# Patient Record
Sex: MALE | Race: WHITE | NOT HISPANIC OR LATINO
[De-identification: names, ages, dates, MRNs, and addresses within clinical notes are randomized per-mention and may not be internally consistent; named-entity substitution may affect disease eponyms.]

---

## 2017-10-02 PROBLEM — Z00.00 ENCOUNTER FOR PREVENTIVE HEALTH EXAMINATION: Status: ACTIVE | Noted: 2017-10-02

## 2017-10-03 ENCOUNTER — APPOINTMENT (OUTPATIENT)
Dept: SURGICAL ONCOLOGY | Facility: CLINIC | Age: 57
End: 2017-10-03
Payer: MEDICAID

## 2017-10-03 VITALS
DIASTOLIC BLOOD PRESSURE: 84 MMHG | BODY MASS INDEX: 28.44 KG/M2 | WEIGHT: 192 LBS | SYSTOLIC BLOOD PRESSURE: 157 MMHG | HEIGHT: 69 IN | TEMPERATURE: 98.7 F | OXYGEN SATURATION: 99 % | HEART RATE: 83 BPM

## 2017-10-03 DIAGNOSIS — A04.8 OTHER SPECIFIED BACTERIAL INTESTINAL INFECTIONS: ICD-10-CM

## 2017-10-03 DIAGNOSIS — Z87.891 PERSONAL HISTORY OF NICOTINE DEPENDENCE: ICD-10-CM

## 2017-10-03 PROCEDURE — 99205 OFFICE O/P NEW HI 60 MIN: CPT

## 2017-10-03 RX ORDER — OMEPRAZOLE 20 MG/1
20 CAPSULE, DELAYED RELEASE ORAL
Refills: 0 | Status: ACTIVE | COMMUNITY

## 2017-10-03 RX ORDER — AMOXICILLIN 500 MG/1
500 TABLET, FILM COATED ORAL
Qty: 56 | Refills: 0 | Status: ACTIVE | COMMUNITY
Start: 2017-10-03 | End: 1900-01-01

## 2017-10-03 RX ORDER — CLARITHROMYCIN 500 MG/1
500 TABLET, FILM COATED ORAL TWICE DAILY
Qty: 28 | Refills: 0 | Status: ACTIVE | COMMUNITY
Start: 2017-10-03 | End: 1900-01-01

## 2017-10-06 ENCOUNTER — OUTPATIENT (OUTPATIENT)
Dept: OUTPATIENT SERVICES | Facility: HOSPITAL | Age: 57
LOS: 1 days | End: 2017-10-06
Payer: MEDICAID

## 2017-10-06 ENCOUNTER — RESULT REVIEW (OUTPATIENT)
Age: 57
End: 2017-10-06

## 2017-10-06 VITALS
HEART RATE: 72 BPM | DIASTOLIC BLOOD PRESSURE: 76 MMHG | HEIGHT: 69 IN | RESPIRATION RATE: 18 BRPM | WEIGHT: 195.11 LBS | SYSTOLIC BLOOD PRESSURE: 126 MMHG | TEMPERATURE: 99 F

## 2017-10-06 DIAGNOSIS — C20 MALIGNANT NEOPLASM OF RECTUM: ICD-10-CM

## 2017-10-06 DIAGNOSIS — Z01.812 ENCOUNTER FOR PREPROCEDURAL LABORATORY EXAMINATION: ICD-10-CM

## 2017-10-06 LAB
ALBUMIN SERPL ELPH-MCNC: 3.5 G/DL — SIGNIFICANT CHANGE UP (ref 3.5–5)
ALP SERPL-CCNC: 74 U/L — SIGNIFICANT CHANGE UP (ref 40–120)
ALT FLD-CCNC: 37 U/L DA — SIGNIFICANT CHANGE UP (ref 10–60)
ANION GAP SERPL CALC-SCNC: 7 MMOL/L — SIGNIFICANT CHANGE UP (ref 5–17)
APTT BLD: 30.7 SEC — SIGNIFICANT CHANGE UP (ref 27.5–37.4)
AST SERPL-CCNC: 12 U/L — SIGNIFICANT CHANGE UP (ref 10–40)
BILIRUB SERPL-MCNC: 0.5 MG/DL — SIGNIFICANT CHANGE UP (ref 0.2–1.2)
BUN SERPL-MCNC: 9 MG/DL — SIGNIFICANT CHANGE UP (ref 7–18)
CALCIUM SERPL-MCNC: 8.6 MG/DL — SIGNIFICANT CHANGE UP (ref 8.4–10.5)
CHLORIDE SERPL-SCNC: 109 MMOL/L — HIGH (ref 96–108)
CO2 SERPL-SCNC: 26 MMOL/L — SIGNIFICANT CHANGE UP (ref 22–31)
CREAT SERPL-MCNC: 0.77 MG/DL — SIGNIFICANT CHANGE UP (ref 0.5–1.3)
GLUCOSE SERPL-MCNC: 115 MG/DL — HIGH (ref 70–99)
HBA1C BLD-MCNC: 5 % — SIGNIFICANT CHANGE UP (ref 4–5.6)
HCT VFR BLD CALC: 45.1 % — SIGNIFICANT CHANGE UP (ref 39–50)
HGB BLD-MCNC: 15.5 G/DL — SIGNIFICANT CHANGE UP (ref 13–17)
INR BLD: 1.05 RATIO — SIGNIFICANT CHANGE UP (ref 0.88–1.16)
MCHC RBC-ENTMCNC: 30.1 PG — SIGNIFICANT CHANGE UP (ref 27–34)
MCHC RBC-ENTMCNC: 34.3 GM/DL — SIGNIFICANT CHANGE UP (ref 32–36)
MCV RBC AUTO: 87.6 FL — SIGNIFICANT CHANGE UP (ref 80–100)
PATHOLOGY CONSULT NOTE: SIGNIFICANT CHANGE UP
PLATELET # BLD AUTO: 200 K/UL — SIGNIFICANT CHANGE UP (ref 150–400)
POTASSIUM SERPL-MCNC: 3.8 MMOL/L — SIGNIFICANT CHANGE UP (ref 3.5–5.3)
POTASSIUM SERPL-SCNC: 3.8 MMOL/L — SIGNIFICANT CHANGE UP (ref 3.5–5.3)
PROT SERPL-MCNC: 6.8 G/DL — SIGNIFICANT CHANGE UP (ref 6–8.3)
PROTHROM AB SERPL-ACNC: 11.5 SEC — SIGNIFICANT CHANGE UP (ref 9.8–12.7)
RBC # BLD: 5.15 M/UL — SIGNIFICANT CHANGE UP (ref 4.2–5.8)
RBC # FLD: 11.4 % — SIGNIFICANT CHANGE UP (ref 10.3–14.5)
SODIUM SERPL-SCNC: 142 MMOL/L — SIGNIFICANT CHANGE UP (ref 135–145)
WBC # BLD: 6.5 K/UL — SIGNIFICANT CHANGE UP (ref 3.8–10.5)
WBC # FLD AUTO: 6.5 K/UL — SIGNIFICANT CHANGE UP (ref 3.8–10.5)

## 2017-10-06 PROCEDURE — 88321 CONSLTJ&REPRT SLD PREP ELSWR: CPT

## 2017-10-06 PROCEDURE — G0463: CPT

## 2017-10-06 PROCEDURE — 85610 PROTHROMBIN TIME: CPT

## 2017-10-06 PROCEDURE — 36415 COLL VENOUS BLD VENIPUNCTURE: CPT

## 2017-10-06 PROCEDURE — 85027 COMPLETE CBC AUTOMATED: CPT

## 2017-10-06 PROCEDURE — 86901 BLOOD TYPING SEROLOGIC RH(D): CPT

## 2017-10-06 PROCEDURE — 85730 THROMBOPLASTIN TIME PARTIAL: CPT

## 2017-10-06 PROCEDURE — 83036 HEMOGLOBIN GLYCOSYLATED A1C: CPT

## 2017-10-06 PROCEDURE — 86850 RBC ANTIBODY SCREEN: CPT

## 2017-10-06 PROCEDURE — 80053 COMPREHEN METABOLIC PANEL: CPT

## 2017-10-06 RX ORDER — ALVIMOPAN 12 MG/1
12 CAPSULE ORAL ONCE
Qty: 0 | Refills: 0 | Status: COMPLETED | OUTPATIENT
Start: 2017-10-19 | End: 2017-10-19

## 2017-10-06 NOTE — H&P PST ADULT - ASSESSMENT
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  Patient also being treated for H pylori.

## 2017-10-06 NOTE — H&P PST ADULT - NSANTHOSAYNRD_GEN_A_CORE
No. JARRETT screening performed.  STOP BANG Legend: 0-2 = LOW Risk; 3-4 = INTERMEDIATE Risk; 5-8 = HIGH Risk

## 2017-10-06 NOTE — H&P PST ADULT - HISTORY OF PRESENT ILLNESS
Patient reports a 6 month history of constipation with occasional blood in stool.  Patient had colonoscopy and EGD on 09/27/17 and was found to have a malignant mass in rectum.

## 2017-10-10 ENCOUNTER — OUTPATIENT (OUTPATIENT)
Dept: OUTPATIENT SERVICES | Facility: HOSPITAL | Age: 57
LOS: 1 days | End: 2017-10-10
Payer: MEDICAID

## 2017-10-10 ENCOUNTER — APPOINTMENT (OUTPATIENT)
Dept: CT IMAGING | Facility: IMAGING CENTER | Age: 57
End: 2017-10-10
Payer: MEDICAID

## 2017-10-10 DIAGNOSIS — C20 MALIGNANT NEOPLASM OF RECTUM: ICD-10-CM

## 2017-10-10 PROCEDURE — 71260 CT THORAX DX C+: CPT

## 2017-10-10 PROCEDURE — 71260 CT THORAX DX C+: CPT | Mod: 26

## 2017-10-10 PROCEDURE — 74177 CT ABD & PELVIS W/CONTRAST: CPT | Mod: 26

## 2017-10-10 PROCEDURE — 74177 CT ABD & PELVIS W/CONTRAST: CPT

## 2017-10-10 RX ORDER — NEOMYCIN SULFATE 500 MG/1
500 TABLET ORAL AS DIRECTED
Qty: 6 | Refills: 0 | Status: ACTIVE | COMMUNITY
Start: 2017-10-10 | End: 1900-01-01

## 2017-10-10 RX ORDER — POTASSIUM CHLORIDE 1500 MG/1
20 TABLET, FILM COATED, EXTENDED RELEASE ORAL AS DIRECTED
Qty: 4 | Refills: 0 | Status: ACTIVE | COMMUNITY
Start: 2017-10-10 | End: 1900-01-01

## 2017-10-10 RX ORDER — METRONIDAZOLE 250 MG/1
250 TABLET ORAL AS DIRECTED
Qty: 3 | Refills: 0 | Status: ACTIVE | COMMUNITY
Start: 2017-10-10 | End: 1900-01-01

## 2017-10-19 ENCOUNTER — RESULT REVIEW (OUTPATIENT)
Age: 57
End: 2017-10-19

## 2017-10-19 ENCOUNTER — INPATIENT (INPATIENT)
Facility: HOSPITAL | Age: 57
LOS: 5 days | Discharge: HOME | DRG: 331 | End: 2017-10-25
Attending: SPECIALIST | Admitting: SPECIALIST
Payer: MEDICAID

## 2017-10-19 ENCOUNTER — TRANSCRIPTION ENCOUNTER (OUTPATIENT)
Age: 57
End: 2017-10-19

## 2017-10-19 ENCOUNTER — APPOINTMENT (OUTPATIENT)
Dept: SURGICAL ONCOLOGY | Facility: HOSPITAL | Age: 57
End: 2017-10-19
Payer: MEDICAID

## 2017-10-19 VITALS
HEART RATE: 81 BPM | HEIGHT: 69 IN | SYSTOLIC BLOOD PRESSURE: 129 MMHG | OXYGEN SATURATION: 99 % | RESPIRATION RATE: 18 BRPM | DIASTOLIC BLOOD PRESSURE: 86 MMHG | TEMPERATURE: 98 F | WEIGHT: 195.11 LBS

## 2017-10-19 DIAGNOSIS — C20 MALIGNANT NEOPLASM OF RECTUM: ICD-10-CM

## 2017-10-19 LAB — GLUCOSE BLDC GLUCOMTR-MCNC: 109 MG/DL — HIGH (ref 70–99)

## 2017-10-19 PROCEDURE — 44145 PARTIAL REMOVAL OF COLON: CPT

## 2017-10-19 PROCEDURE — 50715 RELEASE OF URETER: CPT | Mod: 59

## 2017-10-19 PROCEDURE — 44139 MOBILIZATION OF COLON: CPT

## 2017-10-19 PROCEDURE — 38720 REMOVAL OF LYMPH NODES NECK: CPT

## 2017-10-19 RX ORDER — CEFOTETAN DISODIUM 1 G
2 VIAL (EA) INJECTION EVERY 12 HOURS
Qty: 0 | Refills: 0 | Status: COMPLETED | OUTPATIENT
Start: 2017-10-19 | End: 2017-10-19

## 2017-10-19 RX ORDER — NALOXONE HYDROCHLORIDE 4 MG/.1ML
0.1 SPRAY NASAL
Qty: 0 | Refills: 0 | Status: DISCONTINUED | OUTPATIENT
Start: 2017-10-19 | End: 2017-10-20

## 2017-10-19 RX ORDER — ONDANSETRON 8 MG/1
4 TABLET, FILM COATED ORAL EVERY 6 HOURS
Qty: 0 | Refills: 0 | Status: DISCONTINUED | OUTPATIENT
Start: 2017-10-19 | End: 2017-10-20

## 2017-10-19 RX ORDER — HEPARIN SODIUM 5000 [USP'U]/ML
5000 INJECTION INTRAVENOUS; SUBCUTANEOUS EVERY 8 HOURS
Qty: 0 | Refills: 0 | Status: DISCONTINUED | OUTPATIENT
Start: 2017-10-19 | End: 2017-10-25

## 2017-10-19 RX ORDER — SODIUM CHLORIDE 9 MG/ML
1000 INJECTION, SOLUTION INTRAVENOUS
Qty: 0 | Refills: 0 | Status: DISCONTINUED | OUTPATIENT
Start: 2017-10-19 | End: 2017-10-23

## 2017-10-19 RX ORDER — KETOROLAC TROMETHAMINE 30 MG/ML
30 SYRINGE (ML) INJECTION EVERY 6 HOURS
Qty: 0 | Refills: 0 | Status: DISCONTINUED | OUTPATIENT
Start: 2017-10-19 | End: 2017-10-21

## 2017-10-19 RX ORDER — SODIUM CHLORIDE 9 MG/ML
1000 INJECTION, SOLUTION INTRAVENOUS
Qty: 0 | Refills: 0 | Status: DISCONTINUED | OUTPATIENT
Start: 2017-10-19 | End: 2017-10-19

## 2017-10-19 RX ORDER — SODIUM CHLORIDE 9 MG/ML
3 INJECTION INTRAMUSCULAR; INTRAVENOUS; SUBCUTANEOUS EVERY 8 HOURS
Qty: 0 | Refills: 0 | Status: DISCONTINUED | OUTPATIENT
Start: 2017-10-19 | End: 2017-10-19

## 2017-10-19 RX ORDER — ACETAMINOPHEN 500 MG
1000 TABLET ORAL ONCE
Qty: 0 | Refills: 0 | Status: COMPLETED | OUTPATIENT
Start: 2017-10-19 | End: 2017-10-20

## 2017-10-19 RX ADMIN — ALVIMOPAN 12 MILLIGRAM(S): 12 CAPSULE ORAL at 07:36

## 2017-10-19 RX ADMIN — Medication 100 GRAM(S): at 21:04

## 2017-10-19 RX ADMIN — HEPARIN SODIUM 5000 UNIT(S): 5000 INJECTION INTRAVENOUS; SUBCUTANEOUS at 18:08

## 2017-10-19 RX ADMIN — SODIUM CHLORIDE 3 MILLILITER(S): 9 INJECTION INTRAMUSCULAR; INTRAVENOUS; SUBCUTANEOUS at 07:37

## 2017-10-19 RX ADMIN — SODIUM CHLORIDE 125 MILLILITER(S): 9 INJECTION, SOLUTION INTRAVENOUS at 15:01

## 2017-10-19 RX ADMIN — SODIUM CHLORIDE 3 MILLILITER(S): 9 INJECTION INTRAMUSCULAR; INTRAVENOUS; SUBCUTANEOUS at 07:36

## 2017-10-19 NOTE — BRIEF OPERATIVE NOTE - OPERATION/FINDINGS
rectal tumor 16cm from anal verge. b/l ureters identified. Anastomosis achieved with visualization of distal sigmoid and rectal donuts. >5cm distal margin resected

## 2017-10-19 NOTE — BRIEF OPERATIVE NOTE - PROCEDURE
<<-----Click on this checkbox to enter Procedure Sigmoidoscopy with anesthesia  10/19/2017    Active  [facility]  Low anterior resection of rectum with end-to-end anastomosis  10/19/2017    [facility]  [facility]

## 2017-10-19 NOTE — PROGRESS NOTE ADULT - PROBLEM SELECTOR PLAN 1
1. NPO  2. IV fluids D5 1/2 NS @ 130ml/hr  3. Incentive spirometery  4. DVT prophylaxis  5. Continue cefotetan x 1 dose more  6. Monitor CHAPARRITA output  7. Epidural as per pain management  8. Keep bajwa catheter  9. OOB to chair in AM

## 2017-10-20 DIAGNOSIS — Z90.49 ACQUIRED ABSENCE OF OTHER SPECIFIED PARTS OF DIGESTIVE TRACT: ICD-10-CM

## 2017-10-20 DIAGNOSIS — G89.18 OTHER ACUTE POSTPROCEDURAL PAIN: ICD-10-CM

## 2017-10-20 LAB
ANION GAP SERPL CALC-SCNC: 2 MMOL/L — LOW (ref 5–17)
APTT BLD: 37 SEC — SIGNIFICANT CHANGE UP (ref 27.5–37.4)
BASOPHILS # BLD AUTO: 0 K/UL — SIGNIFICANT CHANGE UP (ref 0–0.2)
BASOPHILS NFR BLD AUTO: 0.5 % — SIGNIFICANT CHANGE UP (ref 0–2)
BUN SERPL-MCNC: 10 MG/DL — SIGNIFICANT CHANGE UP (ref 7–18)
CALCIUM SERPL-MCNC: 8 MG/DL — LOW (ref 8.4–10.5)
CHLORIDE SERPL-SCNC: 106 MMOL/L — SIGNIFICANT CHANGE UP (ref 96–108)
CO2 SERPL-SCNC: 30 MMOL/L — SIGNIFICANT CHANGE UP (ref 22–31)
CREAT SERPL-MCNC: 1.29 MG/DL — SIGNIFICANT CHANGE UP (ref 0.5–1.3)
EOSINOPHIL # BLD AUTO: 0 K/UL — SIGNIFICANT CHANGE UP (ref 0–0.5)
EOSINOPHIL NFR BLD AUTO: 0.1 % — SIGNIFICANT CHANGE UP (ref 0–6)
GLUCOSE SERPL-MCNC: 114 MG/DL — HIGH (ref 70–99)
HCT VFR BLD CALC: 41.1 % — SIGNIFICANT CHANGE UP (ref 39–50)
HGB BLD-MCNC: 13.6 G/DL — SIGNIFICANT CHANGE UP (ref 13–17)
LYMPHOCYTES # BLD AUTO: 1.8 K/UL — SIGNIFICANT CHANGE UP (ref 1–3.3)
LYMPHOCYTES # BLD AUTO: 18 % — SIGNIFICANT CHANGE UP (ref 13–44)
MCHC RBC-ENTMCNC: 29.5 PG — SIGNIFICANT CHANGE UP (ref 27–34)
MCHC RBC-ENTMCNC: 33.2 GM/DL — SIGNIFICANT CHANGE UP (ref 32–36)
MCV RBC AUTO: 88.8 FL — SIGNIFICANT CHANGE UP (ref 80–100)
MONOCYTES # BLD AUTO: 0.8 K/UL — SIGNIFICANT CHANGE UP (ref 0–0.9)
MONOCYTES NFR BLD AUTO: 8 % — SIGNIFICANT CHANGE UP (ref 2–14)
NEUTROPHILS # BLD AUTO: 7.4 K/UL — SIGNIFICANT CHANGE UP (ref 1.8–7.4)
NEUTROPHILS NFR BLD AUTO: 73.4 % — SIGNIFICANT CHANGE UP (ref 43–77)
PLATELET # BLD AUTO: 172 K/UL — SIGNIFICANT CHANGE UP (ref 150–400)
POTASSIUM SERPL-MCNC: 3.7 MMOL/L — SIGNIFICANT CHANGE UP (ref 3.5–5.3)
POTASSIUM SERPL-SCNC: 3.7 MMOL/L — SIGNIFICANT CHANGE UP (ref 3.5–5.3)
RBC # BLD: 4.62 M/UL — SIGNIFICANT CHANGE UP (ref 4.2–5.8)
RBC # FLD: 11.9 % — SIGNIFICANT CHANGE UP (ref 10.3–14.5)
SODIUM SERPL-SCNC: 138 MMOL/L — SIGNIFICANT CHANGE UP (ref 135–145)
WBC # BLD: 10.1 K/UL — SIGNIFICANT CHANGE UP (ref 3.8–10.5)
WBC # FLD AUTO: 10.1 K/UL — SIGNIFICANT CHANGE UP (ref 3.8–10.5)

## 2017-10-20 PROCEDURE — 99233 SBSQ HOSP IP/OBS HIGH 50: CPT

## 2017-10-20 RX ORDER — HYDROMORPHONE HYDROCHLORIDE 2 MG/ML
0.5 INJECTION INTRAMUSCULAR; INTRAVENOUS; SUBCUTANEOUS EVERY 4 HOURS
Qty: 0 | Refills: 0 | Status: DISCONTINUED | OUTPATIENT
Start: 2017-10-20 | End: 2017-10-25

## 2017-10-20 RX ORDER — ACETAMINOPHEN 500 MG
1000 TABLET ORAL ONCE
Qty: 0 | Refills: 0 | Status: COMPLETED | OUTPATIENT
Start: 2017-10-21 | End: 2017-10-21

## 2017-10-20 RX ORDER — ONDANSETRON 8 MG/1
4 TABLET, FILM COATED ORAL ONCE
Qty: 0 | Refills: 0 | Status: COMPLETED | OUTPATIENT
Start: 2017-10-20 | End: 2017-10-20

## 2017-10-20 RX ORDER — ACETAMINOPHEN 500 MG
1000 TABLET ORAL ONCE
Qty: 0 | Refills: 0 | Status: COMPLETED | OUTPATIENT
Start: 2017-10-20 | End: 2017-10-20

## 2017-10-20 RX ORDER — ACETAMINOPHEN 500 MG
650 TABLET ORAL ONCE
Qty: 0 | Refills: 0 | Status: COMPLETED | OUTPATIENT
Start: 2017-10-20 | End: 2017-10-20

## 2017-10-20 RX ADMIN — ONDANSETRON 4 MILLIGRAM(S): 8 TABLET, FILM COATED ORAL at 23:16

## 2017-10-20 RX ADMIN — Medication 1000 MILLIGRAM(S): at 14:00

## 2017-10-20 RX ADMIN — Medication 400 MILLIGRAM(S): at 00:39

## 2017-10-20 RX ADMIN — HEPARIN SODIUM 5000 UNIT(S): 5000 INJECTION INTRAVENOUS; SUBCUTANEOUS at 09:30

## 2017-10-20 RX ADMIN — Medication 30 MILLIGRAM(S): at 16:37

## 2017-10-20 RX ADMIN — Medication 400 MILLIGRAM(S): at 13:52

## 2017-10-20 RX ADMIN — Medication 1000 MILLIGRAM(S): at 00:30

## 2017-10-20 RX ADMIN — HEPARIN SODIUM 5000 UNIT(S): 5000 INJECTION INTRAVENOUS; SUBCUTANEOUS at 17:45

## 2017-10-20 RX ADMIN — Medication 30 MILLIGRAM(S): at 16:00

## 2017-10-20 RX ADMIN — HEPARIN SODIUM 5000 UNIT(S): 5000 INJECTION INTRAVENOUS; SUBCUTANEOUS at 00:40

## 2017-10-20 RX ADMIN — SODIUM CHLORIDE 130 MILLILITER(S): 9 INJECTION, SOLUTION INTRAVENOUS at 13:51

## 2017-10-20 NOTE — PROGRESS NOTE ADULT - PROBLEM SELECTOR PLAN 1
- dc pcea (epidural) due to fevers x2  - PTT repeated this am   - epidural d/c - tip intact  - iv acetaminophen for 4 doses atc  - toradol iv prn  - hydromorphone prn  - oob  - cough and deep breathe  - incentive spirometer - dc pcea (epidural) due to fevers x2  - PTT repeated this am   - epidural d/c - tip intact, no erythema or tenderness at site  - iv acetaminophen for 4 doses atc  - toradol iv prn  - hydromorphone prn  - oob  - cough and deep breathe  - incentive spirometer

## 2017-10-21 LAB
ANION GAP SERPL CALC-SCNC: 8 MMOL/L — SIGNIFICANT CHANGE UP (ref 5–17)
BUN SERPL-MCNC: 6 MG/DL — LOW (ref 7–18)
CALCIUM SERPL-MCNC: 8.2 MG/DL — LOW (ref 8.4–10.5)
CHLORIDE SERPL-SCNC: 107 MMOL/L — SIGNIFICANT CHANGE UP (ref 96–108)
CO2 SERPL-SCNC: 24 MMOL/L — SIGNIFICANT CHANGE UP (ref 22–31)
CREAT SERPL-MCNC: 0.77 MG/DL — SIGNIFICANT CHANGE UP (ref 0.5–1.3)
GLUCOSE SERPL-MCNC: 166 MG/DL — HIGH (ref 70–99)
HCT VFR BLD CALC: 41 % — SIGNIFICANT CHANGE UP (ref 39–50)
HGB BLD-MCNC: 13.4 G/DL — SIGNIFICANT CHANGE UP (ref 13–17)
MCHC RBC-ENTMCNC: 29 PG — SIGNIFICANT CHANGE UP (ref 27–34)
MCHC RBC-ENTMCNC: 32.8 GM/DL — SIGNIFICANT CHANGE UP (ref 32–36)
MCV RBC AUTO: 88.6 FL — SIGNIFICANT CHANGE UP (ref 80–100)
PLATELET # BLD AUTO: 172 K/UL — SIGNIFICANT CHANGE UP (ref 150–400)
POTASSIUM SERPL-MCNC: 3.8 MMOL/L — SIGNIFICANT CHANGE UP (ref 3.5–5.3)
POTASSIUM SERPL-SCNC: 3.8 MMOL/L — SIGNIFICANT CHANGE UP (ref 3.5–5.3)
RBC # BLD: 4.63 M/UL — SIGNIFICANT CHANGE UP (ref 4.2–5.8)
RBC # FLD: 11.9 % — SIGNIFICANT CHANGE UP (ref 10.3–14.5)
SODIUM SERPL-SCNC: 139 MMOL/L — SIGNIFICANT CHANGE UP (ref 135–145)
WBC # BLD: 12.8 K/UL — HIGH (ref 3.8–10.5)
WBC # FLD AUTO: 12.8 K/UL — HIGH (ref 3.8–10.5)

## 2017-10-21 PROCEDURE — 99233 SBSQ HOSP IP/OBS HIGH 50: CPT

## 2017-10-21 RX ORDER — PANTOPRAZOLE SODIUM 20 MG/1
40 TABLET, DELAYED RELEASE ORAL ONCE
Qty: 0 | Refills: 0 | Status: COMPLETED | OUTPATIENT
Start: 2017-10-21 | End: 2017-10-21

## 2017-10-21 RX ADMIN — PANTOPRAZOLE SODIUM 40 MILLIGRAM(S): 20 TABLET, DELAYED RELEASE ORAL at 00:14

## 2017-10-21 RX ADMIN — HEPARIN SODIUM 5000 UNIT(S): 5000 INJECTION INTRAVENOUS; SUBCUTANEOUS at 09:00

## 2017-10-21 RX ADMIN — HEPARIN SODIUM 5000 UNIT(S): 5000 INJECTION INTRAVENOUS; SUBCUTANEOUS at 00:14

## 2017-10-21 RX ADMIN — Medication 30 MILLIGRAM(S): at 05:52

## 2017-10-21 RX ADMIN — Medication 30 MILLIGRAM(S): at 19:28

## 2017-10-21 RX ADMIN — HEPARIN SODIUM 5000 UNIT(S): 5000 INJECTION INTRAVENOUS; SUBCUTANEOUS at 17:12

## 2017-10-21 NOTE — PROGRESS NOTE ADULT - ATTENDING COMMENTS
Seen in telemetry    Awake and alert  Nauseated without emesis  No unusual abdominal discomfort  No flatus    Tm 101.7, otherwise VSS  zh=[id number], CHAPARRITA:55, serosanguinous, stripped    Abdomen is softly distended, nontender    am labs pending    Stable.  Plan: ambulate, pulm toilet, await GI fxn

## 2017-10-21 NOTE — PROGRESS NOTE ADULT - PROBLEM SELECTOR PLAN 1
- iv acetaminophen for 4 doses atc  - toradol iv prn  - hydromorphone prn  - oob  - cough and deep breathe  - incentive spirometer

## 2017-10-22 RX ADMIN — HEPARIN SODIUM 5000 UNIT(S): 5000 INJECTION INTRAVENOUS; SUBCUTANEOUS at 09:00

## 2017-10-22 RX ADMIN — HEPARIN SODIUM 5000 UNIT(S): 5000 INJECTION INTRAVENOUS; SUBCUTANEOUS at 01:08

## 2017-10-22 RX ADMIN — HEPARIN SODIUM 5000 UNIT(S): 5000 INJECTION INTRAVENOUS; SUBCUTANEOUS at 17:22

## 2017-10-22 NOTE — PROGRESS NOTE ADULT - ATTENDING COMMENTS
Recovering from LAR  Seen in "step-down" unit  Awake, alert, and feeling better than yesterday  Ambulating, but no flatus yet    VSS, afeb  Adeq u.o.    Abd is soft and NT  CHAPARRITA, minimal, stripped, serosanguinous    WBC: 10.1 -> 12.8, will monitor    Satisfactory, continue present care

## 2017-10-23 LAB
ANION GAP SERPL CALC-SCNC: 7 MMOL/L — SIGNIFICANT CHANGE UP (ref 5–17)
BASOPHILS # BLD AUTO: 0.1 K/UL — SIGNIFICANT CHANGE UP (ref 0–0.2)
BASOPHILS NFR BLD AUTO: 0.8 % — SIGNIFICANT CHANGE UP (ref 0–2)
BUN SERPL-MCNC: 6 MG/DL — LOW (ref 7–18)
CALCIUM SERPL-MCNC: 8.5 MG/DL — SIGNIFICANT CHANGE UP (ref 8.4–10.5)
CHLORIDE SERPL-SCNC: 105 MMOL/L — SIGNIFICANT CHANGE UP (ref 96–108)
CO2 SERPL-SCNC: 27 MMOL/L — SIGNIFICANT CHANGE UP (ref 22–31)
CREAT SERPL-MCNC: 0.62 MG/DL — SIGNIFICANT CHANGE UP (ref 0.5–1.3)
EOSINOPHIL # BLD AUTO: 0.4 K/UL — SIGNIFICANT CHANGE UP (ref 0–0.5)
EOSINOPHIL NFR BLD AUTO: 6 % — SIGNIFICANT CHANGE UP (ref 0–6)
GLUCOSE SERPL-MCNC: 119 MG/DL — HIGH (ref 70–99)
HCT VFR BLD CALC: 38 % — LOW (ref 39–50)
HGB BLD-MCNC: 13.3 G/DL — SIGNIFICANT CHANGE UP (ref 13–17)
LYMPHOCYTES # BLD AUTO: 1.3 K/UL — SIGNIFICANT CHANGE UP (ref 1–3.3)
LYMPHOCYTES # BLD AUTO: 17.6 % — SIGNIFICANT CHANGE UP (ref 13–44)
MCHC RBC-ENTMCNC: 30.8 PG — SIGNIFICANT CHANGE UP (ref 27–34)
MCHC RBC-ENTMCNC: 35.1 GM/DL — SIGNIFICANT CHANGE UP (ref 32–36)
MCV RBC AUTO: 87.8 FL — SIGNIFICANT CHANGE UP (ref 80–100)
MONOCYTES # BLD AUTO: 0.6 K/UL — SIGNIFICANT CHANGE UP (ref 0–0.9)
MONOCYTES NFR BLD AUTO: 7.8 % — SIGNIFICANT CHANGE UP (ref 2–14)
NEUTROPHILS # BLD AUTO: 4.9 K/UL — SIGNIFICANT CHANGE UP (ref 1.8–7.4)
NEUTROPHILS NFR BLD AUTO: 67.8 % — SIGNIFICANT CHANGE UP (ref 43–77)
PLATELET # BLD AUTO: 185 K/UL — SIGNIFICANT CHANGE UP (ref 150–400)
POTASSIUM SERPL-MCNC: 3.5 MMOL/L — SIGNIFICANT CHANGE UP (ref 3.5–5.3)
POTASSIUM SERPL-SCNC: 3.5 MMOL/L — SIGNIFICANT CHANGE UP (ref 3.5–5.3)
RBC # BLD: 4.33 M/UL — SIGNIFICANT CHANGE UP (ref 4.2–5.8)
RBC # FLD: 11.3 % — SIGNIFICANT CHANGE UP (ref 10.3–14.5)
SODIUM SERPL-SCNC: 139 MMOL/L — SIGNIFICANT CHANGE UP (ref 135–145)
WBC # BLD: 7.2 K/UL — SIGNIFICANT CHANGE UP (ref 3.8–10.5)
WBC # FLD AUTO: 7.2 K/UL — SIGNIFICANT CHANGE UP (ref 3.8–10.5)

## 2017-10-23 RX ORDER — SODIUM CHLORIDE 9 MG/ML
1000 INJECTION, SOLUTION INTRAVENOUS
Qty: 0 | Refills: 0 | Status: DISCONTINUED | OUTPATIENT
Start: 2017-10-23 | End: 2017-10-25

## 2017-10-23 RX ADMIN — HEPARIN SODIUM 5000 UNIT(S): 5000 INJECTION INTRAVENOUS; SUBCUTANEOUS at 01:00

## 2017-10-23 RX ADMIN — HEPARIN SODIUM 5000 UNIT(S): 5000 INJECTION INTRAVENOUS; SUBCUTANEOUS at 23:21

## 2017-10-23 RX ADMIN — HEPARIN SODIUM 5000 UNIT(S): 5000 INJECTION INTRAVENOUS; SUBCUTANEOUS at 17:04

## 2017-10-23 RX ADMIN — HEPARIN SODIUM 5000 UNIT(S): 5000 INJECTION INTRAVENOUS; SUBCUTANEOUS at 10:13

## 2017-10-23 RX ADMIN — SODIUM CHLORIDE 130 MILLILITER(S): 9 INJECTION, SOLUTION INTRAVENOUS at 06:55

## 2017-10-24 RX ADMIN — HEPARIN SODIUM 5000 UNIT(S): 5000 INJECTION INTRAVENOUS; SUBCUTANEOUS at 09:13

## 2017-10-24 RX ADMIN — HEPARIN SODIUM 5000 UNIT(S): 5000 INJECTION INTRAVENOUS; SUBCUTANEOUS at 23:26

## 2017-10-24 RX ADMIN — HEPARIN SODIUM 5000 UNIT(S): 5000 INJECTION INTRAVENOUS; SUBCUTANEOUS at 17:46

## 2017-10-25 ENCOUNTER — TRANSCRIPTION ENCOUNTER (OUTPATIENT)
Age: 57
End: 2017-10-25

## 2017-10-25 VITALS
TEMPERATURE: 98 F | OXYGEN SATURATION: 98 % | HEART RATE: 68 BPM | DIASTOLIC BLOOD PRESSURE: 65 MMHG | RESPIRATION RATE: 16 BRPM | SYSTOLIC BLOOD PRESSURE: 121 MMHG

## 2017-10-25 LAB — SURGICAL PATHOLOGY FINAL REPORT - CH: SIGNIFICANT CHANGE UP

## 2017-10-25 PROCEDURE — 86900 BLOOD TYPING SEROLOGIC ABO: CPT

## 2017-10-25 PROCEDURE — 99238 HOSP IP/OBS DSCHRG MGMT 30/<: CPT | Mod: 24

## 2017-10-25 PROCEDURE — 82962 GLUCOSE BLOOD TEST: CPT

## 2017-10-25 PROCEDURE — 80048 BASIC METABOLIC PNL TOTAL CA: CPT

## 2017-10-25 PROCEDURE — 86901 BLOOD TYPING SEROLOGIC RH(D): CPT

## 2017-10-25 PROCEDURE — 85027 COMPLETE CBC AUTOMATED: CPT

## 2017-10-25 PROCEDURE — 86850 RBC ANTIBODY SCREEN: CPT

## 2017-10-25 PROCEDURE — 85730 THROMBOPLASTIN TIME PARTIAL: CPT

## 2017-10-25 RX ORDER — CLARITHROMYCIN 500 MG
1 TABLET ORAL
Qty: 0 | Refills: 0 | COMMUNITY

## 2017-10-25 RX ORDER — AMOXICILLIN 250 MG/5ML
2 SUSPENSION, RECONSTITUTED, ORAL (ML) ORAL
Qty: 0 | Refills: 0 | COMMUNITY

## 2017-10-25 NOTE — DISCHARGE NOTE ADULT - CARE PROVIDER_API CALL
Trevon Dudley (MD), Surgery  [address] [place] [id number]  Phone: [phone]  Fax: [phone]

## 2017-10-25 NOTE — DISCHARGE NOTE ADULT - MEDICATION SUMMARY - MEDICATIONS TO STOP TAKING
I will STOP taking the medications listed below when I get home from the hospital:    clarithromycin 500 mg oral tablet  -- 1 tab(s) by mouth every 12 hours    amoxicillin 500 mg oral tablet  -- 2 tab(s) by mouth 2 times a day

## 2017-10-25 NOTE — DISCHARGE NOTE ADULT - PLAN OF CARE
Please follow up with Dr. Hines within 1 week   No heavy lifting or straining   Diet as tolerated wound healing

## 2017-10-25 NOTE — DISCHARGE NOTE ADULT - PATIENT PORTAL LINK FT
“You can access the [facility] Patient Portal, offered by [facility], by registering with the following website: [email or web address]”

## 2017-10-25 NOTE — PROGRESS NOTE ADULT - ASSESSMENT
57 y/o male s/p low anterior resection POD # 1.
55 y/o male s/p LAR POD # 5
55 y/o male s/p LAR POD # 6.
s/p low anterior resection for rectal carcinoma 10/19

## 2017-10-25 NOTE — PROGRESS NOTE ADULT - PROVIDER SPECIALTY LIST ADULT
Pain Medicine
Surgery
Pain Medicine

## 2017-10-25 NOTE — DISCHARGE NOTE ADULT - MEDICATION SUMMARY - MEDICATIONS TO TAKE
I will START or STAY ON the medications listed below when I get home from the hospital:    Percocet 5/325 oral tablet  -- 1 tab(s) by mouth every 6 hours, As Needed -for moderate pain MDD:4 tabs   -- Caution federal law prohibits the transfer of this drug to any person other  than the person for whom it was prescribed.  May cause drowsiness.  Alcohol may intensify this effect.  Use care when operating dangerous machinery.  This prescription cannot be refilled.  This product contains acetaminophen.  Do not use  with any other product containing acetaminophen to prevent possible liver damage.  Using more of this medication than prescribed may cause serious breathing problems.    -- Indication: For prn pain

## 2017-10-25 NOTE — DISCHARGE NOTE ADULT - HOSPITAL COURSE
57 y/o male with a 6 month history of constipation with occasional blood in stool.  Patient had colonoscopy and EGD on 09/27/17 and was found to have a malignant mass in rectum. Patient underwent LAR on 10/19/17. patient tolerated procedure well. Diet was advanced and tolerated. Patient for d/c home

## 2017-10-25 NOTE — PROGRESS NOTE ADULT - SUBJECTIVE AND OBJECTIVE BOX
57 y/o male s/p low anterior resection POD # 1. Patient examined at bedside, minimal abd pain.   No nausea, no vomiting. Denies flatus or bm febrile overnight.     T(F): 99.5 (10-20-17 @ 10:21), Max: 101.4 (10-20-17 @ 00:27)  HR: 69 (10-20-17 @ 10:21) (69 - 103)  BP: 107/61 (10-20-17 @ 10:21) (93/58 - 138/61)  RR: 16 (10-20-17 @ 10:21) (11 - 23)  SpO2: 99% (10-20-17 @ 10:21) (96% - 100%)  Wt(kg): --      10-19 @ 07:01  -  10-20 @ 07:00  --------------------------------------------------------  IN:    dextrose 5% + sodium chloride 0.9%.: 1560 mL    IV PiggyBack: 150 mL    lactated ringers.: 500 mL  Total IN: 2210 mL    OUT:    Bulb: 200 mL    Indwelling Catheter - Urethral: 700 mL  Total OUT: 900 mL    Total NET: 1310 mL        Physical Exam  General: AAOx3, No acute distress  Skin: No jaundice, no icterus  Abdomen: soft, mera incisional tenderness, Distended, Dressing Clear dry intact, CHAPARRITA win place with serous sanguinous drainage  tenderness, no guarding, no palpable masses  : Normal external genitalia, bajwa in place   Extremities: non edematous, no calf pain bilaterally
55 y/o male s/p LAR POD # 5. Patient examined at bedside, no complaints.   No nausea, no vomiting  Tolerating diet  + flatus no BM   T(F): 98.9 (10-24-17 @ 06:26), Max: 99.7 (10-23-17 @ 13:03)  HR: 73 (10-24-17 @ 06:26) (73 - 76)  BP: 116/69 (10-24-17 @ 06:26) (116/69 - 129/74)  RR: 16 (10-24-17 @ 06:26) (16 - 16)  SpO2: 95% (10-24-17 @ 06:26) (95% - 99%)  Wt(kg): --      10-23 @ 07:01  -  10-24 @ 07:00  --------------------------------------------------------  IN:  Total IN: 0 mL    OUT:    Bulb: 95 mL    Indwelling Catheter - Urethral: 375 mL    Intermittent Catheterization - Urethral: 225 mL    Voided: 200 mL  Total OUT: 895 mL    Total NET: -895 mL        Physical Exam  General: AAOx3, No acute distress  Skin: No jaundice, no icterus  Abdomen: soft, nontender, nondistended, no rebound tenderness, no guarding, no palpable masses, CHAPARRITA in place with serosanginous drainage   : Normal external genitalia  Extremities: non edematous, no calf pain bilaterally
57 y/o male s/p LAR POD # 6. Patient examined at bedside, no complaints.   No nausea, no vomiting  Tolerating diet  + BM + flatus   T(F): 98.4 (10-25-17 @ 06:13), Max: 99.2 (10-24-17 @ 14:19)  HR: 68 (10-25-17 @ 06:13) (68 - 90)  BP: 121/65 (10-25-17 @ 06:13) (113/80 - 121/65)  RR: 16 (10-25-17 @ 06:13) (16 - 18)  SpO2: 98% (10-25-17 @ 06:13) (97% - 98%)  Wt(kg): --      10-24 @ 07:01  -  10-25 @ 07:00  --------------------------------------------------------  IN:  Total IN: 0 mL    OUT:    Bulb: 95 mL  Total OUT: 95 mL    Total NET: -95 mL          Physical Exam  General: AAOx3, No acute distress  Skin: No jaundice, no icterus  Abdomen: soft, nontender, nondistended, no rebound tenderness, no guarding, no palpable masses, cathy in place with serous drainage   : Normal external genitalia  Extremities: non edematous, no calf pain bilaterally
Chief Complaint: abdominal pain    HPI:   56y Male s/p LAR, POD#2.  Pt complaining of abdominal pain which worsens on exertion.  Pt currently oob to adrian.  No nausea or vomiting  No chest pain or sob.  No numbness or tingling.  + fever last night - 101.7. Pt's epidural was dc yesterday due to fevers.        PAIN SCORE:  4/10       SCALE USED: (1-10 VNRS)    Allergies    No Known Allergies    Intolerances      MEDICATIONS  (STANDING):  acetaminophen  IVPB. 1000 milliGRAM(s) IV Intermittent once  dextrose 5% + sodium chloride 0.9%. 1000 milliLiter(s) (130 mL/Hr) IV Continuous <Continuous>  heparin  Injectable 5000 Unit(s) SubCutaneous every 8 hours    MEDICATIONS  (PRN):  HYDROmorphone  Injectable 0.5 milliGRAM(s) IV Push every 4 hours PRN Severe Pain (7 - 10)  ketorolac   Injectable 30 milliGRAM(s) IV Push every 6 hours PRN Moderate Pain (4 - 6)      PHYSICAL EXAM:    Vital Signs Last 24 Hrs  T(C): 37.4 (21 Oct 2017 06:10), Max: 38.7 (20 Oct 2017 20:45)  T(F): 99.4 (21 Oct 2017 06:10), Max: 101.7 (20 Oct 2017 20:45)  HR: 82 (21 Oct 2017 06:10) (82 - 100)  BP: 143/80 (21 Oct 2017 06:10) (124/77 - 144/71)  BP(mean): --  RR: 18 (21 Oct 2017 06:10) (16 - 18)  SpO2: 95% (21 Oct 2017 06:10) (95% - 98%)             LABS:                          13.4   12.8  )-----------( 172      ( 21 Oct 2017 09:04 )             41.0     10-21    139  |  107  |  6<L>  ----------------------------<  166<H>  3.8   |  24  |  0.77    Ca    8.2<L>      21 Oct 2017 09:04      PTT - ( 20 Oct 2017 10:58 )  PTT:37.0 sec      Drug Screen:        RADIOLOGY:
Surgery Post-Operative Note    Subjective:  Patient seen at bedside  States feels well. Denies nausea, vomiting.       T(C): 37.6 (10-19-17 @ 18:02), Max: 37.7 (10-19-17 @ 14:30)  HR: 94 (10-19-17 @ 18:02) (79 - 95)  BP: 108/56 (10-19-17 @ 18:02) (93/58 - 138/61)  RR: 17 (10-19-17 @ 18:02) (11 - 23)  SpO2: 96% (10-19-17 @ 18:02) (96% - 100%)  Wt(kg): --    MEDICATIONS  (STANDING):  cefoTEtan  IVPB 2 Gram(s) IV Intermittent every 12 hours  dextrose 5% + sodium chloride 0.9%. 1000 milliLiter(s) (130 mL/Hr) IV Continuous <Continuous>  fentaNYL (2 MICROgram(s)/mL) + BUpivacaine 0.0625%  in 0.9% Sodium Chloride PCEA 250 milliLiter(s) Epidural PCA Continuous  heparin  Injectable 5000 Unit(s) SubCutaneous every 8 hours    MEDICATIONS  (PRN):  naloxone Injectable 0.1 milliGRAM(s) IV Push every 3 minutes PRN For ANY of the following changes in patient status:  A. RR LESS THAN 10 breaths per minute, B. Oxygen saturation LESS THAN 90%, C. Sedation score of 6  ondansetron Injectable 4 milliGRAM(s) IV Push every 6 hours PRN Nausea      Physical Exam:    Gen: awake, alert oriented NAD  HEENT: dry oropharynx  Abd: midline abdominal wound dressed, CHAPARRITA in place with serosanguinous output  Pelvic: bajwa catheter in place with clear but concentrated urine  EXT: SCDs in place    I&O's Detail    19 Oct 2017 07:01  -  19 Oct 2017 18:46  --------------------------------------------------------  IN:    lactated ringers.: 500 mL  Total IN: 500 mL    OUT:    Bulb: 140 mL    Indwelling Catheter - Urethral: 100 mL  Total OUT: 240 mL    Total NET: 260 mL
Vital Signs Last 24 Hrs  T(C): 36.9 (25 Oct 2017 06:13), Max: 37.3 (24 Oct 2017 14:19)  T(F): 98.4 (25 Oct 2017 06:13), Max: 99.2 (24 Oct 2017 14:19)  HR: 68 (25 Oct 2017 06:13) (68 - 90)  BP: 121/65 (25 Oct 2017 06:13) (113/80 - 121/65)  BP(mean): --  RR: 16 (25 Oct 2017 06:13) (16 - 18)  SpO2: 98% (25 Oct 2017 06:13) (97% - 98%)    I&O's Detail    24 Oct 2017 07:01  -  25 Oct 2017 07:00  --------------------------------------------------------  IN:  Total IN: 0 mL    OUT:    Bulb: 95 mL  Total OUT: 95 mL    Total NET: -95 mL    tolerating regular diet with BM's  J-P d/C'ed    Plan:  Discharge home today  Instructions given
Vital Signs Last 24 Hrs  T(C): 37.1 (23 Oct 2017 05:41), Max: 37.3 (22 Oct 2017 13:17)  T(F): 98.8 (23 Oct 2017 05:41), Max: 99.1 (22 Oct 2017 13:17)  HR: 75 (23 Oct 2017 05:41) (75 - 89)  BP: 124/75 (23 Oct 2017 05:41) (124/74 - 125/73)  BP(mean): --  RR: 16 (23 Oct 2017 05:41) (16 - 18)  SpO2: 97% (23 Oct 2017 05:41) (97% - 100%)    I&O's Detail    22 Oct 2017 07:01  -  23 Oct 2017 07:00  --------------------------------------------------------  IN:  Total IN: 0 mL    OUT:    Bulb: 45 mL    Indwelling Catheter - Urethral: 400 mL  Total OUT: 445 mL    Total NET: -445 mL                                13.4   12.8  )-----------( 172      ( 21 Oct 2017 09:04 )             41.0       10-21    139  |  107  |  6<L>  ----------------------------<  166<H>  3.8   |  24  |  0.77    Ca    8.2<L>      21 Oct 2017 09:04    incision clear  J-P bland  PtJulien passing flatus            PLAN:  Clear liquid diet  Ambulate
Vital Signs Last 24 Hrs  T(C): 37.2 (24 Oct 2017 06:26), Max: 37.3 (23 Oct 2017 22:33)  T(F): 98.9 (24 Oct 2017 06:26), Max: 99.1 (23 Oct 2017 22:33)  HR: 73 (24 Oct 2017 06:26) (73 - 76)  BP: 116/69 (24 Oct 2017 06:26) (116/69 - 127/74)  BP(mean): --  RR: 16 (24 Oct 2017 06:26) (16 - 16)  SpO2: 95% (24 Oct 2017 06:26) (95% - 99%)    I&O's Detail    23 Oct 2017 07:01  -  24 Oct 2017 07:00  --------------------------------------------------------  IN:  Total IN: 0 mL    OUT:    Bulb: 95 mL    Indwelling Catheter - Urethral: 375 mL    Intermittent Catheterization - Urethral: 225 mL    Voided: 200 mL  Total OUT: 895 mL    Total NET: -895 mL      24 Oct 2017 07:01  -  24 Oct 2017 14:12  --------------------------------------------------------  IN:  Total IN: 0 mL    OUT:    Bulb: 30 mL  Total OUT: 30 mL    Total NET: -30 mL                                13.3   7.2   )-----------( 185      ( 23 Oct 2017 07:31 )             38.0       10-23    139  |  105  |  6<L>  ----------------------------<  119<H>  3.5   |  27  |  0.62    Ca    8.5      23 Oct 2017 07:31    Tolerating diet  Voiding without difficulty  J-P bland            PLAN:    D/C J-P tomorrow AM and discharge pt. home
Vital Signs Last 24 Hrs  T(C): 37.5 (20 Oct 2017 10:21), Max: 38.6 (20 Oct 2017 00:27)  T(F): 99.5 (20 Oct 2017 10:21), Max: 101.4 (20 Oct 2017 00:27)  HR: 69 (20 Oct 2017 10:21) (69 - 103)  BP: 107/61 (20 Oct 2017 10:21) (93/58 - 138/61)  BP(mean): 75 (19 Oct 2017 14:30) (65 - 75)  RR: 16 (20 Oct 2017 10:21) (11 - 23)  SpO2: 99% (20 Oct 2017 10:21) (96% - 100%)    I&O's Detail    19 Oct 2017 07:01  -  20 Oct 2017 07:00  --------------------------------------------------------  IN:    dextrose 5% + sodium chloride 0.9%.: 1560 mL    IV PiggyBack: 150 mL    lactated ringers.: 500 mL  Total IN: 2210 mL    OUT:    Bulb: 200 mL    Indwelling Catheter - Urethral: 700 mL  Total OUT: 900 mL    Total NET: 1310 mL                                13.6   10.1  )-----------( 172      ( 20 Oct 2017 08:28 )             41.1       10-20    138  |  106  |  10  ----------------------------<  114<H>  3.7   |  30  |  1.29    Ca    8.0<L>      20 Oct 2017 08:28        PTT - ( 20 Oct 2017 10:58 )  PTT:37.0 sec    No nausea  Dressing Dry  J-P drainage bland    PLAN:    Incentive spirometry  Ambulate  NPO until pt. passes flatus
Chief Complaint: abdominal pain    HPI:   56y Male s/p lar, POD#1.  Pt complaining of abdominal pain which increases on exertion.  No nausea or vomiting.  No chest pain.  +numbness left thigh.  Pt oob to chair.  PCEA of fentanyl and bupivicaine by bedside.  +temp overnight and early this morning.        PAIN SCORE:   4/10      SCALE USED: (1-10 VNRS)    Allergies    No Known Allergies    Intolerances      MEDICATIONS  (STANDING):  dextrose 5% + sodium chloride 0.9%. 1000 milliLiter(s) (130 mL/Hr) IV Continuous <Continuous>  heparin  Injectable 5000 Unit(s) SubCutaneous every 8 hours    MEDICATIONS  (PRN):  acetaminophen    Suspension 650 milliGRAM(s) Oral once PRN fever  HYDROmorphone  Injectable 0.5 milliGRAM(s) IV Push every 4 hours PRN Severe Pain (7 - 10)  ketorolac   Injectable 30 milliGRAM(s) IV Push every 6 hours PRN Moderate Pain (4 - 6)  naloxone Injectable 0.1 milliGRAM(s) IV Push every 3 minutes PRN For ANY of the following changes in patient status:  A. RR LESS THAN 10 breaths per minute, B. Oxygen saturation LESS THAN 90%, C. Sedation score of 6  ondansetron Injectable 4 milliGRAM(s) IV Push every 6 hours PRN Nausea      PHYSICAL EXAM:    Vital Signs Last 24 Hrs  T(C): 38.7 (20 Oct 2017 20:45), Max: 38.7 (20 Oct 2017 20:45)  T(F): 101.7 (20 Oct 2017 20:45), Max: 101.7 (20 Oct 2017 20:45)  HR: 100 (20 Oct 2017 20:45) (69 - 103)  BP: 144/71 (20 Oct 2017 20:45) (96/55 - 144/71)  BP(mean): --  RR: 18 (20 Oct 2017 20:45) (16 - 18)  SpO2: 96% (20 Oct 2017 20:45) (96% - 99%)             LABS:                          13.6   10.1  )-----------( 172      ( 20 Oct 2017 08:28 )             41.1     10-20    138  |  106  |  10  ----------------------------<  114<H>  3.7   |  30  |  1.29    Ca    8.0<L>      20 Oct 2017 08:28      PTT - ( 20 Oct 2017 10:58 )  PTT:37.0 sec      Drug Screen:        RADIOLOGY:

## 2017-10-25 NOTE — DISCHARGE NOTE ADULT - CARE PLAN
Principal Discharge DX:	Malignant neoplasm of rectum  Goal:	wound healing  Instructions for follow-up, activity and diet:	Please follow up with Dr. Hines within 1 week   No heavy lifting or straining   Diet as tolerated

## 2017-10-25 NOTE — PROGRESS NOTE ADULT - PROBLEM SELECTOR PROBLEM 1
Malignant neoplasm of rectum
Acute post-operative pain
Acute post-operative pain

## 2017-10-27 DIAGNOSIS — Z80.1 FAMILY HISTORY OF MALIGNANT NEOPLASM OF TRACHEA, BRONCHUS AND LUNG: ICD-10-CM

## 2017-10-27 DIAGNOSIS — C20 MALIGNANT NEOPLASM OF RECTUM: ICD-10-CM

## 2017-10-27 DIAGNOSIS — Z28.21 IMMUNIZATION NOT CARRIED OUT BECAUSE OF PATIENT REFUSAL: ICD-10-CM

## 2017-10-27 DIAGNOSIS — G89.18 OTHER ACUTE POSTPROCEDURAL PAIN: ICD-10-CM

## 2017-11-07 ENCOUNTER — APPOINTMENT (OUTPATIENT)
Dept: SURGICAL ONCOLOGY | Facility: CLINIC | Age: 57
End: 2017-11-07
Payer: MEDICAID

## 2017-11-07 VITALS
SYSTOLIC BLOOD PRESSURE: 132 MMHG | HEART RATE: 88 BPM | DIASTOLIC BLOOD PRESSURE: 88 MMHG | BODY MASS INDEX: 28.44 KG/M2 | WEIGHT: 192 LBS | TEMPERATURE: 98.6 F | HEIGHT: 69 IN | OXYGEN SATURATION: 99 %

## 2017-11-07 PROCEDURE — 99024 POSTOP FOLLOW-UP VISIT: CPT

## 2018-01-08 ENCOUNTER — EMERGENCY (EMERGENCY)
Facility: HOSPITAL | Age: 58
LOS: 1 days | Discharge: HOME | End: 2018-01-08
Attending: EMERGENCY MEDICINE
Payer: MEDICAID

## 2018-01-08 VITALS
WEIGHT: 190.04 LBS | SYSTOLIC BLOOD PRESSURE: 133 MMHG | TEMPERATURE: 99 F | DIASTOLIC BLOOD PRESSURE: 72 MMHG | RESPIRATION RATE: 18 BRPM | OXYGEN SATURATION: 98 % | HEART RATE: 78 BPM | HEIGHT: 69.69 IN

## 2018-01-08 PROCEDURE — 99284 EMERGENCY DEPT VISIT MOD MDM: CPT

## 2018-01-08 PROCEDURE — 99284 EMERGENCY DEPT VISIT MOD MDM: CPT | Mod: 25

## 2018-01-08 PROCEDURE — 96374 THER/PROPH/DIAG INJ IV PUSH: CPT

## 2018-01-08 PROCEDURE — 96375 TX/PRO/DX INJ NEW DRUG ADDON: CPT

## 2018-01-08 RX ORDER — SUCRALFATE 1 G
1 TABLET ORAL
Qty: 56 | Refills: 0
Start: 2018-01-08 | End: 2018-01-21

## 2018-01-08 RX ORDER — ESOMEPRAZOLE MAGNESIUM 40 MG/1
1 CAPSULE, DELAYED RELEASE ORAL
Qty: 30 | Refills: 0
Start: 2018-01-08 | End: 2018-02-06

## 2018-01-08 RX ORDER — LIDOCAINE 4 G/100G
10 CREAM TOPICAL ONCE
Qty: 0 | Refills: 0 | Status: COMPLETED | OUTPATIENT
Start: 2018-01-08 | End: 2018-01-08

## 2018-01-08 RX ORDER — FAMOTIDINE 10 MG/ML
20 INJECTION INTRAVENOUS ONCE
Qty: 0 | Refills: 0 | Status: COMPLETED | OUTPATIENT
Start: 2018-01-08 | End: 2018-01-08

## 2018-01-08 RX ORDER — FAMOTIDINE 10 MG/ML
1 INJECTION INTRAVENOUS
Qty: 10 | Refills: 0
Start: 2018-01-08 | End: 2018-01-12

## 2018-01-08 RX ORDER — ONDANSETRON 8 MG/1
4 TABLET, FILM COATED ORAL ONCE
Qty: 0 | Refills: 0 | Status: COMPLETED | OUTPATIENT
Start: 2018-01-08 | End: 2018-01-08

## 2018-01-08 RX ADMIN — LIDOCAINE 10 MILLILITER(S): 4 CREAM TOPICAL at 20:26

## 2018-01-08 RX ADMIN — ONDANSETRON 4 MILLIGRAM(S): 8 TABLET, FILM COATED ORAL at 20:24

## 2018-01-08 RX ADMIN — Medication 30 MILLILITER(S): at 20:26

## 2018-01-08 RX ADMIN — FAMOTIDINE 20 MILLIGRAM(S): 10 INJECTION INTRAVENOUS at 20:27

## 2018-01-08 NOTE — ED PROVIDER NOTE - OBJECTIVE STATEMENT
58 y/o M pt w/ PMHx of gastritis, presents to ED c/o epigastric pain x yesterday. Pt reports he has a history of gastritis and gets epigastric pain every few months, w/ it usually resolving on it's own. Pt reports he had pasta w/ fried cheese, a couple of clementines prior to onset. Pt denies any vomiting, fever, chills, urinary symptoms, or any other complaints. NKDA.

## 2018-02-12 LAB
ALBUMIN SERPL ELPH-MCNC: 4.2 G/DL
ALP BLD-CCNC: 88 U/L
ALT SERPL-CCNC: 28 U/L
AST SERPL-CCNC: 16 U/L
BILIRUB DIRECT SERPL-MCNC: 0.2 MG/DL
BILIRUB INDIRECT SERPL-MCNC: 0.4 MG/DL
BILIRUB SERPL-MCNC: 0.6 MG/DL
CANCER AG19-9 SERPL-ACNC: 6.7 U/ML
CEA SERPL-MCNC: 0.8 NG/ML
PROT SERPL-MCNC: 7.2 G/DL

## 2018-02-13 ENCOUNTER — APPOINTMENT (OUTPATIENT)
Dept: SURGICAL ONCOLOGY | Facility: CLINIC | Age: 58
End: 2018-02-13
Payer: MEDICAID

## 2018-02-13 VITALS
OXYGEN SATURATION: 100 % | BODY MASS INDEX: 27.11 KG/M2 | SYSTOLIC BLOOD PRESSURE: 128 MMHG | HEIGHT: 69 IN | TEMPERATURE: 98.4 F | HEART RATE: 69 BPM | DIASTOLIC BLOOD PRESSURE: 80 MMHG | WEIGHT: 183 LBS

## 2018-02-13 PROCEDURE — 99214 OFFICE O/P EST MOD 30 MIN: CPT

## 2018-04-17 ENCOUNTER — APPOINTMENT (OUTPATIENT)
Dept: CT IMAGING | Facility: IMAGING CENTER | Age: 58
End: 2018-04-17
Payer: MEDICAID

## 2018-04-17 ENCOUNTER — OUTPATIENT (OUTPATIENT)
Dept: OUTPATIENT SERVICES | Facility: HOSPITAL | Age: 58
LOS: 1 days | End: 2018-04-17
Payer: MEDICAID

## 2018-04-17 DIAGNOSIS — C20 MALIGNANT NEOPLASM OF RECTUM: ICD-10-CM

## 2018-04-17 PROCEDURE — 74177 CT ABD & PELVIS W/CONTRAST: CPT | Mod: 26

## 2018-04-17 PROCEDURE — 74177 CT ABD & PELVIS W/CONTRAST: CPT

## 2018-05-08 ENCOUNTER — LABORATORY RESULT (OUTPATIENT)
Age: 58
End: 2018-05-08

## 2018-05-15 ENCOUNTER — APPOINTMENT (OUTPATIENT)
Dept: SURGICAL ONCOLOGY | Facility: CLINIC | Age: 58
End: 2018-05-15
Payer: MEDICAID

## 2018-05-15 VITALS
WEIGHT: 185 LBS | OXYGEN SATURATION: 100 % | DIASTOLIC BLOOD PRESSURE: 79 MMHG | SYSTOLIC BLOOD PRESSURE: 125 MMHG | BODY MASS INDEX: 27.4 KG/M2 | HEIGHT: 69 IN | TEMPERATURE: 98.8 F | HEART RATE: 80 BPM

## 2018-05-15 PROCEDURE — 99214 OFFICE O/P EST MOD 30 MIN: CPT

## 2018-08-21 ENCOUNTER — APPOINTMENT (OUTPATIENT)
Dept: SURGICAL ONCOLOGY | Facility: CLINIC | Age: 58
End: 2018-08-21

## 2018-08-31 ENCOUNTER — LABORATORY RESULT (OUTPATIENT)
Age: 58
End: 2018-08-31

## 2018-09-04 ENCOUNTER — APPOINTMENT (OUTPATIENT)
Dept: SURGICAL ONCOLOGY | Facility: CLINIC | Age: 58
End: 2018-09-04
Payer: COMMERCIAL

## 2018-09-11 ENCOUNTER — APPOINTMENT (OUTPATIENT)
Dept: SURGICAL ONCOLOGY | Facility: CLINIC | Age: 58
End: 2018-09-11
Payer: COMMERCIAL

## 2018-09-11 VITALS
SYSTOLIC BLOOD PRESSURE: 135 MMHG | OXYGEN SATURATION: 99 % | WEIGHT: 192 LBS | HEIGHT: 69 IN | DIASTOLIC BLOOD PRESSURE: 84 MMHG | HEART RATE: 63 BPM | TEMPERATURE: 97.9 F | BODY MASS INDEX: 28.44 KG/M2

## 2018-09-11 PROBLEM — C20 MALIGNANT NEOPLASM OF RECTUM: Chronic | Status: ACTIVE | Noted: 2017-10-06

## 2018-09-11 PROCEDURE — 99214 OFFICE O/P EST MOD 30 MIN: CPT

## 2018-10-05 ENCOUNTER — FORM ENCOUNTER (OUTPATIENT)
Age: 58
End: 2018-10-05

## 2018-10-06 ENCOUNTER — APPOINTMENT (OUTPATIENT)
Dept: CT IMAGING | Facility: IMAGING CENTER | Age: 58
End: 2018-10-06
Payer: MEDICAID

## 2018-10-06 ENCOUNTER — OUTPATIENT (OUTPATIENT)
Dept: OUTPATIENT SERVICES | Facility: HOSPITAL | Age: 58
LOS: 1 days | End: 2018-10-06
Payer: MEDICAID

## 2018-10-06 DIAGNOSIS — C19 MALIGNANT NEOPLASM OF RECTOSIGMOID JUNCTION: ICD-10-CM

## 2018-10-06 PROCEDURE — 74177 CT ABD & PELVIS W/CONTRAST: CPT

## 2018-10-06 PROCEDURE — 74177 CT ABD & PELVIS W/CONTRAST: CPT | Mod: 26

## 2019-01-11 ENCOUNTER — LABORATORY RESULT (OUTPATIENT)
Age: 59
End: 2019-01-11

## 2019-01-15 ENCOUNTER — APPOINTMENT (OUTPATIENT)
Dept: SURGICAL ONCOLOGY | Facility: CLINIC | Age: 59
End: 2019-01-15
Payer: COMMERCIAL

## 2019-01-15 VITALS
SYSTOLIC BLOOD PRESSURE: 147 MMHG | TEMPERATURE: 98 F | DIASTOLIC BLOOD PRESSURE: 81 MMHG | WEIGHT: 195 LBS | HEART RATE: 66 BPM | HEIGHT: 69 IN | BODY MASS INDEX: 28.88 KG/M2

## 2019-01-15 PROCEDURE — 99214 OFFICE O/P EST MOD 30 MIN: CPT

## 2019-01-15 NOTE — ASSESSMENT
Shahnaz from [facility] called and stated that patient has no apatite  , weak and jaundice,,  Shahnaz can be Reached at [phone]   [FreeTextEntry1] : IMP:\par Stage II colon cancer- RESHMA\par \par PLAN:\par RTO q 4 months with bloodwork\par CT q 6 months (next in April 2019 )\par Colonoscopy in 1 year (1/2020)

## 2019-01-15 NOTE — PHYSICAL EXAM
[Normal] : supple, no neck mass and thyroid not enlarged [Normal Supraclavicular Lymph Nodes] : normal supraclavicular lymph nodes [Normal] : oriented to person, place and time, with appropriate affect [Normal Groin Lymph Nodes] : normal groin lymph nodes [de-identified] : Vertical midline incision well-healed.....small defect mid incision; no hepato-splenomegaly

## 2019-01-15 NOTE — HISTORY OF PRESENT ILLNESS
[de-identified] : 57 year-old male returns for follow up for rectosigmoid cancer, s/p LAR on 10/19/17.  Final pathology of the sigmoid and rectum was T3N0 adenocarcinoma with 14 negative lymph nodes and negative margins, MMR -Proficient.  \par \par Oncotype Dx: 3 (3 year recurrence risk of 10% following surgery alone and 4-5% with adjuvant chemotherapy).\par \par Most recent CT of the abdomen and pelvis in October 2018 demonstrated minimal small bowel thickening at the anastomosis, stable from 4/2018 and most likely consistent with postoperative changes.   His last colonoscopy performed on 1/3/19 was unremarkable as well.\par \par Labs 1/2019:\par CEA: 1.0 ng/ml\par CA 19-9: 6.5 U/ml\par LFT: WNL\par \par He did follow up with GI to ensure resolution of his H. Pylori.  He is feeling well in general, tolerating diet without nausea/vomiting, and denies pain or constitutional symptoms.  His bowel habits are slowly returning to baseline.   His previously described coccydynia is improving as well.  He does note a 2 week history of a bulge in his abdomen to to the right of the incision but there is no associated pain or vomiting.   \par \par Previous pertinent history is as follows:\par \par Seen for initially consultation on 10/3/17, having been referred by Dr. Jack Che.  He reported a 5 month history of increasing constipation.  Given his symptoms and his age he presented for an initial screening colonoscopy on 9/27/17, which revealed a large circumferential rectal mass 15 cm from the anal verge, obstructing 80% of the lumen.  Biopsy was positive for invasive moderately differentiated adenocarcinoma, MMR-proficient.  An upper endoscopy was also performed given reflux symptoms, which showed gastritis but was otherwise negative.  Gastric biopsies were positive for H. pylori associated gastritis.  We prescribed triple therapy following his initial consultation. \par \par His PMH is otherwise unremarkable and he has had no prior surgeries.  \par \par PCP: Suad Wright

## 2019-04-18 ENCOUNTER — FORM ENCOUNTER (OUTPATIENT)
Age: 59
End: 2019-04-18

## 2019-04-19 ENCOUNTER — APPOINTMENT (OUTPATIENT)
Dept: CT IMAGING | Facility: IMAGING CENTER | Age: 59
End: 2019-04-19
Payer: COMMERCIAL

## 2019-04-19 ENCOUNTER — OUTPATIENT (OUTPATIENT)
Dept: OUTPATIENT SERVICES | Facility: HOSPITAL | Age: 59
LOS: 1 days | End: 2019-04-19
Payer: MEDICAID

## 2019-04-19 DIAGNOSIS — Z00.8 ENCOUNTER FOR OTHER GENERAL EXAMINATION: ICD-10-CM

## 2019-04-19 PROCEDURE — 74177 CT ABD & PELVIS W/CONTRAST: CPT | Mod: 26

## 2019-04-19 PROCEDURE — 74177 CT ABD & PELVIS W/CONTRAST: CPT

## 2019-05-21 ENCOUNTER — LABORATORY RESULT (OUTPATIENT)
Age: 59
End: 2019-05-21

## 2019-05-28 ENCOUNTER — APPOINTMENT (OUTPATIENT)
Dept: SURGICAL ONCOLOGY | Facility: CLINIC | Age: 59
End: 2019-05-28
Payer: COMMERCIAL

## 2019-05-28 VITALS
BODY MASS INDEX: 28.88 KG/M2 | HEART RATE: 50 BPM | SYSTOLIC BLOOD PRESSURE: 129 MMHG | WEIGHT: 195 LBS | DIASTOLIC BLOOD PRESSURE: 86 MMHG | TEMPERATURE: 98 F | HEIGHT: 69 IN | OXYGEN SATURATION: 100 %

## 2019-05-28 PROCEDURE — 99214 OFFICE O/P EST MOD 30 MIN: CPT

## 2019-05-28 NOTE — PHYSICAL EXAM
[Normal] : supple, no neck mass and thyroid not enlarged [Normal Supraclavicular Lymph Nodes] : normal supraclavicular lymph nodes [Normal Groin Lymph Nodes] : normal groin lymph nodes [Normal] : oriented to person, place and time, with appropriate affect [de-identified] : Vertical midline incision well-healed. Small defect mid incision; no hepato-splenomegaly

## 2019-05-28 NOTE — HISTORY OF PRESENT ILLNESS
[de-identified] : 57 year-old male returns for follow up for rectosigmoid cancer, s/p LAR on 10/19/17.  Final pathology of the sigmoid and rectum was T3N0 adenocarcinoma with 14 negative lymph nodes and negative margins, MMR -Proficient.  \par \par Oncotype Dx: 3 (3 year recurrence risk of 10% following surgery alone and 4-5% with adjuvant chemotherapy).\par \par His last colonoscopy performed on 1/3/19 was unremarkable.\par \par Most recent CT of the abdomen and pelvis in April 2019 demonstrated no evidence of recurrent or metastatic disease, and previously seen wall thickening at the anastomosis has decreased, likely consistent with postop change.   \par \par Labs 5/2019:\par CEA: 0.8 ng/ml\par CA 19-9: 5.0 U/ml\par LFT: WNL\par \par He did follow up with GI to ensure resolution of his H. Pylori.  He is feeling well in general, tolerating diet without nausea/vomiting, and denies pain or constitutional symptoms.  His bowel habits are slowly returning to baseline.   His previously described coccydynia is improving as well.  During his last visit in January 2019 he reported a 2 week history of a bulge in his abdomen to to the right of the incision but there is no associated pain or vomiting.   Exam was notable for a small defect at the mid incision.\par \par Previous pertinent history is as follows:\par \par Seen for initially consultation on 10/3/17, having been referred by Dr. Jack Che.  He reported a 5 month history of increasing constipation.  Given his symptoms and his age he presented for an initial screening colonoscopy on 9/27/17, which revealed a large circumferential rectal mass 15 cm from the anal verge, obstructing 80% of the lumen.  Biopsy was positive for invasive moderately differentiated adenocarcinoma, MMR-proficient.  An upper endoscopy was also performed given reflux symptoms, which showed gastritis but was otherwise negative.  Gastric biopsies were positive for H. pylori associated gastritis.  We prescribed triple therapy following his initial consultation. \par \par His PMH is otherwise unremarkable and he has had no prior surgeries.  \par \par PCP: Suad Wright

## 2019-05-28 NOTE — ASSESSMENT
[FreeTextEntry1] : IMP:\par Stage II colon cancer- RESHMA\par \par PLAN:\par RTO q 4 months with bloodwork\par CT q 6 months (next in October 2019 )\par Colonoscopy in 1 year (1/2020)

## 2019-09-30 ENCOUNTER — LABORATORY RESULT (OUTPATIENT)
Age: 59
End: 2019-09-30

## 2019-10-01 ENCOUNTER — APPOINTMENT (OUTPATIENT)
Dept: SURGICAL ONCOLOGY | Facility: CLINIC | Age: 59
End: 2019-10-01
Payer: COMMERCIAL

## 2019-10-01 VITALS
HEIGHT: 69 IN | OXYGEN SATURATION: 99 % | HEART RATE: 58 BPM | TEMPERATURE: 98.3 F | SYSTOLIC BLOOD PRESSURE: 150 MMHG | WEIGHT: 206 LBS | BODY MASS INDEX: 30.51 KG/M2 | DIASTOLIC BLOOD PRESSURE: 88 MMHG

## 2019-10-01 PROCEDURE — 99214 OFFICE O/P EST MOD 30 MIN: CPT

## 2019-10-01 NOTE — ASSESSMENT
[FreeTextEntry1] : IMP:\par Stage II colon cancer- RESHMA\par \par PLAN:\par RTO q 6 months with bloodwork\par CT yearly (next in April 2020 )\par Colonoscopy  yearly (1/2020)

## 2019-10-01 NOTE — HISTORY OF PRESENT ILLNESS
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  Final pathology of the sigmoid and rectum was T3N0 adenocarcinoma with 14 negative lymph nodes and negative margins, MMR -Proficient.  \par \par Oncotype Dx: 3 (3 year recurrence risk of 10% following surgery alone and 4-5% with adjuvant chemotherapy).\par \par His last colonoscopy performed on 1/3/19 was unremarkable.\par \par Most recent CT of the abdomen and pelvis in April 2019 demonstrated no evidence of recurrent or metastatic disease, and previously seen wall thickening at the anastomosis has decreased, likely consistent with postop change.   \par \par Labs 9/2019:\par CEA: 1.0 ng/ml\par CA 19-9: 4 U/ml\par LFT: WNL\par \par He did follow up with GI to ensure resolution of his H. Pylori.  He is feeling well in general, tolerating diet without nausea/vomiting, and denies pain or constitutional symptoms.  His bowel habits are slowly returning to baseline.  During his last visit in January 2019 he reported a 2 week history of a bulge in his abdomen to to the right of the incision but there is no associated pain or vomiting.   Exam was notable for a small defect at the mid incision.  This has been stable per the patient. \par \par Previous pertinent history is as follows:\par \par Seen for initially consultation on 10/3/17, having been referred by Dr. Jack Che.  He reported a 5 month history of increasing constipation.  Given his symptoms and his age he presented for an initial screening colonoscopy on 9/27/17, which revealed a large circumferential rectal mass 15 cm from the anal verge, obstructing 80% of the lumen.  Biopsy was positive for invasive moderately differentiated adenocarcinoma, MMR-proficient.  An upper endoscopy was also performed given reflux symptoms, which showed gastritis but was otherwise negative.  Gastric biopsies were positive for H. pylori associated gastritis.  We prescribed triple therapy following his initial consultation. \par \par His PMH is otherwise unremarkable and he has had no prior surgeries.  \par \par PCP: Suad Wright

## 2019-10-01 NOTE — PHYSICAL EXAM
[Normal] : supple, no neck mass and thyroid not enlarged [Normal Supraclavicular Lymph Nodes] : normal supraclavicular lymph nodes [Normal Groin Lymph Nodes] : normal groin lymph nodes [Normal] : grossly intact [de-identified] : Vertical midline incision well-healed. Small defect mid incision; no hepato-splenomegaly

## 2020-02-17 ENCOUNTER — INPATIENT (INPATIENT)
Facility: HOSPITAL | Age: 60
LOS: 4 days | Discharge: HOME | DRG: 418 | End: 2020-02-22
Attending: SURGERY | Admitting: SURGERY
Payer: COMMERCIAL

## 2020-02-17 VITALS
SYSTOLIC BLOOD PRESSURE: 144 MMHG | OXYGEN SATURATION: 97 % | TEMPERATURE: 98 F | HEART RATE: 84 BPM | HEIGHT: 66 IN | WEIGHT: 199.96 LBS | DIASTOLIC BLOOD PRESSURE: 88 MMHG | RESPIRATION RATE: 20 BRPM

## 2020-02-17 DIAGNOSIS — Z90.49 ACQUIRED ABSENCE OF OTHER SPECIFIED PARTS OF DIGESTIVE TRACT: Chronic | ICD-10-CM

## 2020-02-17 DIAGNOSIS — K63.5 POLYP OF COLON: Chronic | ICD-10-CM

## 2020-02-17 LAB
ALBUMIN SERPL ELPH-MCNC: 4.1 G/DL — SIGNIFICANT CHANGE UP (ref 3.5–5)
ALP SERPL-CCNC: 142 U/L — HIGH (ref 40–120)
ALT FLD-CCNC: 420 U/L DA — HIGH (ref 10–60)
ANION GAP SERPL CALC-SCNC: 3 MMOL/L — LOW (ref 5–17)
AST SERPL-CCNC: 302 U/L — HIGH (ref 10–40)
BASOPHILS # BLD AUTO: 0.06 K/UL — SIGNIFICANT CHANGE UP (ref 0–0.2)
BASOPHILS NFR BLD AUTO: 0.6 % — SIGNIFICANT CHANGE UP (ref 0–2)
BILIRUB SERPL-MCNC: 4.5 MG/DL — HIGH (ref 0.2–1.2)
BUN SERPL-MCNC: 9 MG/DL — SIGNIFICANT CHANGE UP (ref 7–18)
CALCIUM SERPL-MCNC: 9.1 MG/DL — SIGNIFICANT CHANGE UP (ref 8.4–10.5)
CHLORIDE SERPL-SCNC: 104 MMOL/L — SIGNIFICANT CHANGE UP (ref 96–108)
CO2 SERPL-SCNC: 31 MMOL/L — SIGNIFICANT CHANGE UP (ref 22–31)
CREAT SERPL-MCNC: 1.09 MG/DL — SIGNIFICANT CHANGE UP (ref 0.5–1.3)
EOSINOPHIL # BLD AUTO: 0.02 K/UL — SIGNIFICANT CHANGE UP (ref 0–0.5)
EOSINOPHIL NFR BLD AUTO: 0.2 % — SIGNIFICANT CHANGE UP (ref 0–6)
GLUCOSE SERPL-MCNC: 117 MG/DL — HIGH (ref 70–99)
HCT VFR BLD CALC: 47.8 % — SIGNIFICANT CHANGE UP (ref 39–50)
HGB BLD-MCNC: 16.9 G/DL — SIGNIFICANT CHANGE UP (ref 13–17)
IMM GRANULOCYTES NFR BLD AUTO: 0.2 % — SIGNIFICANT CHANGE UP (ref 0–1.5)
LIDOCAIN IGE QN: 89 U/L — SIGNIFICANT CHANGE UP (ref 73–393)
LYMPHOCYTES # BLD AUTO: 1.15 K/UL — SIGNIFICANT CHANGE UP (ref 1–3.3)
LYMPHOCYTES # BLD AUTO: 11 % — LOW (ref 13–44)
MCHC RBC-ENTMCNC: 30.2 PG — SIGNIFICANT CHANGE UP (ref 27–34)
MCHC RBC-ENTMCNC: 35.4 GM/DL — SIGNIFICANT CHANGE UP (ref 32–36)
MCV RBC AUTO: 85.5 FL — SIGNIFICANT CHANGE UP (ref 80–100)
MONOCYTES # BLD AUTO: 0.63 K/UL — SIGNIFICANT CHANGE UP (ref 0–0.9)
MONOCYTES NFR BLD AUTO: 6.1 % — SIGNIFICANT CHANGE UP (ref 2–14)
NEUTROPHILS # BLD AUTO: 8.53 K/UL — HIGH (ref 1.8–7.4)
NEUTROPHILS NFR BLD AUTO: 81.9 % — HIGH (ref 43–77)
NRBC # BLD: 0 /100 WBCS — SIGNIFICANT CHANGE UP (ref 0–0)
PLATELET # BLD AUTO: 256 K/UL — SIGNIFICANT CHANGE UP (ref 150–400)
POTASSIUM SERPL-MCNC: 4 MMOL/L — SIGNIFICANT CHANGE UP (ref 3.5–5.3)
POTASSIUM SERPL-SCNC: 4 MMOL/L — SIGNIFICANT CHANGE UP (ref 3.5–5.3)
PROT SERPL-MCNC: 7.8 G/DL — SIGNIFICANT CHANGE UP (ref 6–8.3)
RBC # BLD: 5.59 M/UL — SIGNIFICANT CHANGE UP (ref 4.2–5.8)
RBC # FLD: 12.4 % — SIGNIFICANT CHANGE UP (ref 10.3–14.5)
SODIUM SERPL-SCNC: 138 MMOL/L — SIGNIFICANT CHANGE UP (ref 135–145)
WBC # BLD: 10.41 K/UL — SIGNIFICANT CHANGE UP (ref 3.8–10.5)
WBC # FLD AUTO: 10.41 K/UL — SIGNIFICANT CHANGE UP (ref 3.8–10.5)

## 2020-02-17 RX ORDER — ONDANSETRON 8 MG/1
4 TABLET, FILM COATED ORAL ONCE
Refills: 0 | Status: COMPLETED | OUTPATIENT
Start: 2020-02-17 | End: 2020-02-17

## 2020-02-17 RX ORDER — FAMOTIDINE 10 MG/ML
20 INJECTION INTRAVENOUS ONCE
Refills: 0 | Status: COMPLETED | OUTPATIENT
Start: 2020-02-17 | End: 2020-02-17

## 2020-02-17 RX ORDER — SODIUM CHLORIDE 9 MG/ML
1000 INJECTION INTRAMUSCULAR; INTRAVENOUS; SUBCUTANEOUS ONCE
Refills: 0 | Status: COMPLETED | OUTPATIENT
Start: 2020-02-17 | End: 2020-02-17

## 2020-02-17 RX ORDER — LIDOCAINE 4 G/100G
10 CREAM TOPICAL ONCE
Refills: 0 | Status: COMPLETED | OUTPATIENT
Start: 2020-02-17 | End: 2020-02-17

## 2020-02-17 RX ADMIN — Medication 30 MILLILITER(S): at 22:44

## 2020-02-17 RX ADMIN — Medication 20 MILLIGRAM(S): at 22:45

## 2020-02-17 RX ADMIN — SODIUM CHLORIDE 1000 MILLILITER(S): 9 INJECTION INTRAMUSCULAR; INTRAVENOUS; SUBCUTANEOUS at 21:43

## 2020-02-17 RX ADMIN — FAMOTIDINE 20 MILLIGRAM(S): 10 INJECTION INTRAVENOUS at 21:43

## 2020-02-17 RX ADMIN — LIDOCAINE 10 MILLILITER(S): 4 CREAM TOPICAL at 22:44

## 2020-02-17 RX ADMIN — ONDANSETRON 4 MILLIGRAM(S): 8 TABLET, FILM COATED ORAL at 21:43

## 2020-02-17 NOTE — ED ADULT NURSE NOTE - CAS TRG GEN SKIN CONDITION
If you are a smoker, it is important for your health to stop smoking. Please be aware that second hand smoke is also harmful. Warm

## 2020-02-17 NOTE — ED PROVIDER NOTE - CPE EDP RESP NORM
[FreeTextEntry1] : review of system normal other than stated in the history of present illness normal...

## 2020-02-17 NOTE — ED PROVIDER NOTE - CONSTITUTIONAL, MLM
normal... Well appearing, awake, alert, oriented to person, place, time/situation and in no apparent distress. awake, alert, oriented to person, place, time/situation

## 2020-02-17 NOTE — ED ADULT TRIAGE NOTE - CHIEF COMPLAINT QUOTE
pt compliant of stomach pain with vomiting that started yesterday. Pt stated he has history of gastrsis.

## 2020-02-17 NOTE — ED PROVIDER NOTE - PROGRESS NOTE DETAILS
Discussed case with surgery Da HARRISON who will come evaluate patient. Requests CT scan. Patient with transaminitis. Concern for gallbladder pathology. Discussed case with surgery Da HARRISON who will come evaluate patient. Requests CT scan. Surgery at bedside evaluating patient. Pending CT scan. Plan of care signed out to Dr. Hamlin. patient signedout to me by Dr. Gore. Awaiting CT and surgical recommendations.  CT shows Hyperattenuating material in the gallbladder lumen is compatible with sludge. Rightupperquadrant ultrasound may be obtained to exclude gallstones.Small hiatal hernia.Mild asymmetric wall thickening along the right side of the rectum is nonspecific; correlate with recent colonoscopy results.   Surgical team recommends admission for US and further managament. Discussed above with patient. KAT Hamlin MD

## 2020-02-17 NOTE — ED PROVIDER NOTE - OBJECTIVE STATEMENT
59 year old male with PMHx of colon cancer and gastritis and PSHx of a colon resection in 2017 presents to the ED with complaints of epigastric pain since yesterday. Patient reports having multiple episodes of nonbloody and nonbilious emesis associated with some nausea. Patient states that his epigastric pain is consistent with his past gastritis symptoms. Patient reportedly underwent a colonoscopy two days ago and had a poly removed. Patient notes that he has not had any acidic foods, coffee, and has not taken any NSAIDs, but does admit to having onions and peppers yesterday. Patient states that he took an antacid with minimal relief to his symptoms. Patient otherwise denies all other acute complaints.  NKDA. 59 year old male with PMHx of rectal cancer  and gastritis and PSHx of a rectal resection in 2017 presents to the ED with complaints of epigastric pain since yesterday. Patient reports having multiple episodes of nonbloody and nonbilious emesis associated with some nausea. Patient states that his epigastric pain is consistent with his past gastritis symptoms. Patient reportedly underwent a colonoscopy two days ago and had a polyp removed. Denies acidic foods, coffee, and has not taken any NSAIDs. Admit having onions and peppers yesterday. Denies fevers, chest pain, shortness of breath, dysuria, hematuria, diarrhea, constipation, or any other acute complaints.

## 2020-02-18 DIAGNOSIS — R74.0 NONSPECIFIC ELEVATION OF LEVELS OF TRANSAMINASE AND LACTIC ACID DEHYDROGENASE [LDH]: ICD-10-CM

## 2020-02-18 DIAGNOSIS — K29.70 GASTRITIS, UNSPECIFIED, WITHOUT BLEEDING: ICD-10-CM

## 2020-02-18 LAB
ALBUMIN SERPL ELPH-MCNC: 3.7 G/DL — SIGNIFICANT CHANGE UP (ref 3.5–5)
ALP SERPL-CCNC: 151 U/L — HIGH (ref 40–120)
ALT FLD-CCNC: 564 U/L DA — HIGH (ref 10–60)
ANION GAP SERPL CALC-SCNC: 6 MMOL/L — SIGNIFICANT CHANGE UP (ref 5–17)
APTT BLD: 30.6 SEC — SIGNIFICANT CHANGE UP (ref 27.5–36.3)
AST SERPL-CCNC: 283 U/L — HIGH (ref 10–40)
BILIRUB DIRECT SERPL-MCNC: 2.1 MG/DL — HIGH (ref 0–0.2)
BILIRUB SERPL-MCNC: 3.7 MG/DL — HIGH (ref 0.2–1.2)
BLD GP AB SCN SERPL QL: SIGNIFICANT CHANGE UP
BUN SERPL-MCNC: 9 MG/DL — SIGNIFICANT CHANGE UP (ref 7–18)
CALCIUM SERPL-MCNC: 9.2 MG/DL — SIGNIFICANT CHANGE UP (ref 8.4–10.5)
CHLORIDE SERPL-SCNC: 106 MMOL/L — SIGNIFICANT CHANGE UP (ref 96–108)
CO2 SERPL-SCNC: 28 MMOL/L — SIGNIFICANT CHANGE UP (ref 22–31)
CREAT SERPL-MCNC: 0.96 MG/DL — SIGNIFICANT CHANGE UP (ref 0.5–1.3)
GLUCOSE SERPL-MCNC: 104 MG/DL — HIGH (ref 70–99)
HCT VFR BLD CALC: 44.9 % — SIGNIFICANT CHANGE UP (ref 39–50)
HCV AB S/CO SERPL IA: 0.5 S/CO — SIGNIFICANT CHANGE UP (ref 0–0.99)
HCV AB SERPL-IMP: SIGNIFICANT CHANGE UP
HGB BLD-MCNC: 15.6 G/DL — SIGNIFICANT CHANGE UP (ref 13–17)
INR BLD: 1.19 RATIO — HIGH (ref 0.88–1.16)
LIDOCAIN IGE QN: 70 U/L — LOW (ref 73–393)
MCHC RBC-ENTMCNC: 30.1 PG — SIGNIFICANT CHANGE UP (ref 27–34)
MCHC RBC-ENTMCNC: 34.7 GM/DL — SIGNIFICANT CHANGE UP (ref 32–36)
MCV RBC AUTO: 86.5 FL — SIGNIFICANT CHANGE UP (ref 80–100)
NRBC # BLD: 0 /100 WBCS — SIGNIFICANT CHANGE UP (ref 0–0)
PLATELET # BLD AUTO: 192 K/UL — SIGNIFICANT CHANGE UP (ref 150–400)
POTASSIUM SERPL-MCNC: 3.9 MMOL/L — SIGNIFICANT CHANGE UP (ref 3.5–5.3)
POTASSIUM SERPL-SCNC: 3.9 MMOL/L — SIGNIFICANT CHANGE UP (ref 3.5–5.3)
PROT SERPL-MCNC: 6.9 G/DL — SIGNIFICANT CHANGE UP (ref 6–8.3)
PROTHROM AB SERPL-ACNC: 13.3 SEC — HIGH (ref 10–12.9)
RBC # BLD: 5.19 M/UL — SIGNIFICANT CHANGE UP (ref 4.2–5.8)
RBC # FLD: 12.6 % — SIGNIFICANT CHANGE UP (ref 10.3–14.5)
SODIUM SERPL-SCNC: 140 MMOL/L — SIGNIFICANT CHANGE UP (ref 135–145)
WBC # BLD: 6.12 K/UL — SIGNIFICANT CHANGE UP (ref 3.8–10.5)
WBC # FLD AUTO: 6.12 K/UL — SIGNIFICANT CHANGE UP (ref 3.8–10.5)

## 2020-02-18 PROCEDURE — 99222 1ST HOSP IP/OBS MODERATE 55: CPT

## 2020-02-18 PROCEDURE — 74177 CT ABD & PELVIS W/CONTRAST: CPT | Mod: 26

## 2020-02-18 PROCEDURE — 99285 EMERGENCY DEPT VISIT HI MDM: CPT

## 2020-02-18 RX ORDER — MORPHINE SULFATE 50 MG/1
4 CAPSULE, EXTENDED RELEASE ORAL EVERY 6 HOURS
Refills: 0 | Status: DISCONTINUED | OUTPATIENT
Start: 2020-02-18 | End: 2020-02-21

## 2020-02-18 RX ORDER — CEFOTETAN DISODIUM 1 G
VIAL (EA) INJECTION
Refills: 0 | Status: DISCONTINUED | OUTPATIENT
Start: 2020-02-18 | End: 2020-02-21

## 2020-02-18 RX ORDER — CEFOTETAN DISODIUM 1 G
1 VIAL (EA) INJECTION EVERY 12 HOURS
Refills: 0 | Status: DISCONTINUED | OUTPATIENT
Start: 2020-02-19 | End: 2020-02-21

## 2020-02-18 RX ORDER — KETOROLAC TROMETHAMINE 30 MG/ML
30 SYRINGE (ML) INJECTION EVERY 6 HOURS
Refills: 0 | Status: DISCONTINUED | OUTPATIENT
Start: 2020-02-18 | End: 2020-02-18

## 2020-02-18 RX ORDER — HEPARIN SODIUM 5000 [USP'U]/ML
5000 INJECTION INTRAVENOUS; SUBCUTANEOUS EVERY 8 HOURS
Refills: 0 | Status: DISCONTINUED | OUTPATIENT
Start: 2020-02-18 | End: 2020-02-21

## 2020-02-18 RX ORDER — INFLUENZA VIRUS VACCINE 15; 15; 15; 15 UG/.5ML; UG/.5ML; UG/.5ML; UG/.5ML
0.5 SUSPENSION INTRAMUSCULAR ONCE
Refills: 0 | Status: COMPLETED | OUTPATIENT
Start: 2020-02-18 | End: 2020-02-18

## 2020-02-18 RX ORDER — SODIUM CHLORIDE 9 MG/ML
1000 INJECTION, SOLUTION INTRAVENOUS
Refills: 0 | Status: DISCONTINUED | OUTPATIENT
Start: 2020-02-18 | End: 2020-02-21

## 2020-02-18 RX ORDER — ONDANSETRON 8 MG/1
4 TABLET, FILM COATED ORAL EVERY 6 HOURS
Refills: 0 | Status: DISCONTINUED | OUTPATIENT
Start: 2020-02-18 | End: 2020-02-21

## 2020-02-18 RX ORDER — ACETAMINOPHEN 500 MG
1000 TABLET ORAL ONCE
Refills: 0 | Status: DISCONTINUED | OUTPATIENT
Start: 2020-02-18 | End: 2020-02-21

## 2020-02-18 RX ORDER — CEFOTETAN DISODIUM 1 G
1 VIAL (EA) INJECTION ONCE
Refills: 0 | Status: COMPLETED | OUTPATIENT
Start: 2020-02-18 | End: 2020-02-18

## 2020-02-18 RX ADMIN — HEPARIN SODIUM 5000 UNIT(S): 5000 INJECTION INTRAVENOUS; SUBCUTANEOUS at 21:25

## 2020-02-18 RX ADMIN — Medication 100 GRAM(S): at 15:25

## 2020-02-18 RX ADMIN — SODIUM CHLORIDE 120 MILLILITER(S): 9 INJECTION, SOLUTION INTRAVENOUS at 21:28

## 2020-02-18 NOTE — CONSULT NOTE ADULT - ASSESSMENT
[facility] GI CONSULTATION    Patient is a 59y old  Male who presents with a chief complaint of   HPI:  59 year old male with PMHx of gastritis and s/p colorectal surgery for polyp in 2017 presents to the ED with complaints of epigastric pain x1day.  Patient reports having multiple episodes of nonbloody and nonbilious emesis associated with some nausea. Patient states that his epigastric pain is consistent with his past gastritis symptoms but underwent a colonoscopy two days ago and had a polyp removed. Denies acidic foods, coffee, and has not taken any NSAIDs. Admit having onions and peppers and some greasy food yesterday. Denies fevers, chest pain, shortness of breath, dysuria, hematuria, diarrhea, constipation, or any other acute complaints. (18 Feb 2020 04:40)    PMH/PSH:  PAST MEDICAL & SURGICAL HISTORY:  Gastritis  Malignant neoplasm of rectum  Colon polyps  History of colon resection: 2017    FH:  FAMILY HISTORY:  Family history of lung cancer      MEDS:  MEDICATIONS  (STANDING):  acetaminophen  IVPB .. 1000 milliGRAM(s) IV Intermittent once  dextrose 5% + sodium chloride 0.45%. 1000 milliLiter(s) (120 mL/Hr) IV Continuous <Continuous>    MEDICATIONS  (PRN):  morphine  - Injectable 4 milliGRAM(s) IV Push every 6 hours PRN Moderate Pain (4 - 6)  ondansetron Injectable 4 milliGRAM(s) IV Push every 6 hours PRN Nausea and/or Vomiting    Allergies    No Known Allergies    Intolerances            CONSTITUTIONAL:  No weight loss, fever, chills, weakness or fatigue.  HEENT:  Eyes:  No visual loss, blurred vision, double vision or yellow sclerae. Ears, Nose, Throat:  No hearing loss, sneezing, congestion, runny nose or sore throat.  SKIN:  No rash or itching.  CARDIOVASCULAR:  No chest pain, chest pressure or chest discomfort. No palpitations or edema.  RESPIRATORY:  No shortness of breath, cough or sputum.  GASTROINTESTINAL:  SEE HPI  GENITOURINARY:  No dysuria, hematuria, urinary frequency  NEUROLOGICAL:  No headache, dizziness, syncope, paralysis, ataxia, numbness or tingling in the extremities. No change in bowel or bladder control.  MUSCULOSKELETAL:  No muscle, back pain, joint pain or stiffness.  HEMATOLOGIC:  No anemia, bleeding or bruising.  LYMPHATICS:  No enlarged nodes. No history of splenectomy.  PSYCHIATRIC:  No history of depression or anxiety.  ENDOCRINOLOGIC:  No reports of sweating, cold or heat intolerance. No polyuria or polydipsia.      ______________________________________________________________________  PHYSICAL EXAM:  T(C): 36.6 (02-18-20 @ 09:06), Max: 37 (02-18-20 @ 01:11)  HR: 65 (02-18-20 @ 09:06)  BP: 122/66 (02-18-20 @ 09:06)  RR: 16 (02-18-20 @ 09:06)  SpO2: 97% (02-18-20 @ 09:06)  Wt(kg): --      GEN: NAD, normocephalic  CVS: S1S2+  CHEST: clear to auscultation  ABD: soft , nontender, nondistended, bowel sounds present  EXTR: no cyanosis, no clubbing, no edema  NEURO: Awake and alert; oriented .....  SKIN:  warm;  non icteric    ______________________________________________________________________  LABS:                        16.9   10.41 )-----------( 256      ( 17 Feb 2020 22:29 )             47.8     02-17    138  |  104  |  9   ----------------------------<  117<H>  4.0   |  31  |  1.09    Ca    9.1      17 Feb 2020 22:29    TPro  7.8  /  Alb  4.1  /  TBili  4.5<H>  /  DBili  x   /  AST  302<H>  /  ALT  420<H>  /  AlkPhos  142<H>  02-17    GI asked to evaluate this 59 year old male with PMHx of gastritis and s/p colorectal surgery for polyp in 2017 who presented to the ED with complaints of epigastric pain associated with nausea  x1day.  LFT's elevated and CT A/P shows sludge.  Pt admitted to r/o Acute Amy or Symptomatic  Cholelithiasis.  Patient seen and examined srinivas in bed in NAD. Afebrile with stable vital signs. Reports having colonoscopy 2/12/20 showing gastritis

## 2020-02-18 NOTE — CONSULT NOTE ADULT - SUBJECTIVE AND OBJECTIVE BOX
[facility] GI CONSULTATION    Patient is a 59y old  Male who presents with a chief complaint of epigastric pain associated with nausea    HPI:  59 year old male with PMHx of gastritis and s/p colorectal surgery for polyps in 2017 presents to the ED with complaints of epigastric pain x1day.  Patient reports having multiple episodes of nonbloody and nonbilious emesis associated with some nausea. Patient states that his epigastric pain is consistent with his past gastritis symptoms but underwent a colonoscopy two days ago and had a polyp removed. Denies acidic foods, coffee, and has not taken any NSAIDs. Admit having onions and peppers and some greasy food yesterday. Denies fevers, chest pain, shortness of breath, dysuria, hematuria, diarrhea, constipation, or any other acute complaints. (18 Feb 2020 04:40)    PMH/PSH:  PAST MEDICAL & SURGICAL HISTORY:  Gastritis  Malignant neoplasm of rectum  Colon polyps  History of colon resection: 2017    FH:  FAMILY HISTORY:  Family history of lung cancer      MEDS:  MEDICATIONS  (STANDING):  acetaminophen  IVPB .. 1000 milliGRAM(s) IV Intermittent once  dextrose 5% + sodium chloride 0.45%. 1000 milliLiter(s) (120 mL/Hr) IV Continuous <Continuous>    MEDICATIONS  (PRN):  morphine  - Injectable 4 milliGRAM(s) IV Push every 6 hours PRN Moderate Pain (4 - 6)  ondansetron Injectable 4 milliGRAM(s) IV Push every 6 hours PRN Nausea and/or Vomiting    Allergies    No Known Allergies    Intolerances            CONSTITUTIONAL:  No weight loss, fever, chills, weakness or fatigue.  HEENT:  Eyes:  No visual loss, blurred vision, double vision or yellow sclerae. Ears, Nose, Throat:  No hearing loss, sneezing, congestion, runny nose or sore throat.  SKIN:  No rash or itching.  CARDIOVASCULAR:  No chest pain, chest pressure or chest discomfort. No palpitations or edema.  RESPIRATORY:  No shortness of breath, cough or sputum.  GASTROINTESTINAL:  SEE HPI  GENITOURINARY:  No dysuria, hematuria, urinary frequency  NEUROLOGICAL:  No headache, dizziness, syncope, paralysis, ataxia, numbness or tingling in the extremities. No change in bowel or bladder control.  MUSCULOSKELETAL:  No muscle, back pain, joint pain or stiffness.  HEMATOLOGIC:  No anemia, bleeding or bruising.  LYMPHATICS:  No enlarged nodes. No history of splenectomy.  PSYCHIATRIC:  No history of depression or anxiety.  ENDOCRINOLOGIC:  No reports of sweating, cold or heat intolerance. No polyuria or polydipsia.      ______________________________________________________________________  PHYSICAL EXAM:  T(C): 36.6 (02-18-20 @ 09:06), Max: 37 (02-18-20 @ 01:11)  HR: 65 (02-18-20 @ 09:06)  BP: 122/66 (02-18-20 @ 09:06)  RR: 16 (02-18-20 @ 09:06)  SpO2: 97% (02-18-20 @ 09:06)  Wt(kg): --      GEN: NAD, normocephalic  CVS: S1S2+  CHEST: clear to auscultation  ABD: soft , nontender, nondistended, bowel sounds present, no rebound, no guarding  EXTR: no cyanosis, no clubbing, no edema  NEURO: Awake and alert; oriented to person, place, time, and situation.   SKIN:  warm;  non icteric    ______________________________________________________________________  LABS:                        16.9   10.41 )-----------( 256      ( 17 Feb 2020 22:29 )             47.8     02-17    138  |  104  |  9   ----------------------------<  117<H>  4.0   |  31  |  1.09    Ca    9.1      17 Feb 2020 22:29    TPro  7.8  /  Alb  4.1  /  TBili  4.5<H>  /  DBili  x   /  AST  302<H>  /  ALT  420<H>  /  AlkPhos  142<H>  02-17    LIVER FUNCTIONS - ( 17 Feb 2020 22:29 )  Alb: 4.1 g/dL / Pro: 7.8 g/dL / ALK PHOS: 142 U/L / ALT: 420 U/L DA / AST: 302 U/L / GGT: x             ____________________________________________    IMAGING:    CT A/P - Hyperattenuating material in the gallbladder lumen is compatible with sludge; Small hiatal hernia. Mild asymmetric wall thickening along the right side of the rectum [facility] GI CONSULTATION    Patient is a 59y old  Male who presents with a chief complaint of epigastric pain associated with nausea    HPI:  59 year old male with PMHx of gastritis and s/p colorectal surgery for polyps in 2017 presents to the ED with complaints of epigastric pain x1day.  Patient reports having multiple episodes of nonbloody and nonbilious emesis associated with some nausea. Patient states that his epigastric pain is consistent with his past gastritis symptoms but underwent a colonoscopy two days ago and had a polyp removed. Denies acidic foods, coffee, and has not taken any NSAIDs. Admit having onions and peppers and some greasy food yesterday. Denies fevers, chest pain, shortness of breath, dysuria, hematuria, diarrhea, constipation, or any other acute complaints. (18 Feb 2020 04:40)    PMH/PSH:  PAST MEDICAL & SURGICAL HISTORY:  Gastritis  Malignant neoplasm of rectum  Colon polyps  History of colon resection: 2017    FH:  FAMILY HISTORY:  Family history of lung cancer      MEDS:  MEDICATIONS  (STANDING):  acetaminophen  IVPB .. 1000 milliGRAM(s) IV Intermittent once  dextrose 5% + sodium chloride 0.45%. 1000 milliLiter(s) (120 mL/Hr) IV Continuous <Continuous>    MEDICATIONS  (PRN):  morphine  - Injectable 4 milliGRAM(s) IV Push every 6 hours PRN Moderate Pain (4 - 6)  ondansetron Injectable 4 milliGRAM(s) IV Push every 6 hours PRN Nausea and/or Vomiting    Allergies    No Known Allergies    Intolerances            CONSTITUTIONAL:  No weight loss, fever, chills, weakness or fatigue.  HEENT:  Eyes:  No visual loss, blurred vision, double vision or yellow sclerae. Ears, Nose, Throat:  No hearing loss, sneezing, congestion, runny nose or sore throat.  SKIN:  No rash or itching.  CARDIOVASCULAR:  No chest pain, chest pressure or chest discomfort. No palpitations or edema.  RESPIRATORY:  No shortness of breath, cough or sputum.  GASTROINTESTINAL:  SEE HPI  GENITOURINARY:  No dysuria, hematuria, urinary frequency  NEUROLOGICAL:  No headache, dizziness, syncope, paralysis, ataxia, numbness or tingling in the extremities. No change in bowel or bladder control.  MUSCULOSKELETAL:  No muscle, back pain, joint pain or stiffness.  HEMATOLOGIC:  No anemia, bleeding or bruising.  LYMPHATICS:  No enlarged nodes. No history of splenectomy.  PSYCHIATRIC:  No history of depression or anxiety.  ENDOCRINOLOGIC:  No reports of sweating, cold or heat intolerance. No polyuria or polydipsia.      ______________________________________________________________________  PHYSICAL EXAM:  T(C): 36.6 (02-18-20 @ 09:06), Max: 37 (02-18-20 @ 01:11)  HR: 65 (02-18-20 @ 09:06)  BP: 122/66 (02-18-20 @ 09:06)  RR: 16 (02-18-20 @ 09:06)  SpO2: 97% (02-18-20 @ 09:06)  Wt(kg): --      GEN: NAD, normocephalic  HEENT: mild icteric sclera  CVS: S1S2+  CHEST: clear to auscultation  ABD: soft , nontender, nondistended, bowel sounds present, no rebound, no guarding  EXTR: no cyanosis, no clubbing, no edema  NEURO: Awake and alert; oriented to person, place, time, and situation.   SKIN:  warm;  non icteric    ______________________________________________________________________  LABS:                        16.9   10.41 )-----------( 256      ( 17 Feb 2020 22:29 )             47.8     02-17    138  |  104  |  9   ----------------------------<  117<H>  4.0   |  31  |  1.09    Ca    9.1      17 Feb 2020 22:29    TPro  7.8  /  Alb  4.1  /  TBili  4.5<H>  /  DBili  x   /  AST  302<H>  /  ALT  420<H>  /  AlkPhos  142<H>  02-17    LIVER FUNCTIONS - ( 17 Feb 2020 22:29 )  Alb: 4.1 g/dL / Pro: 7.8 g/dL / ALK PHOS: 142 U/L / ALT: 420 U/L DA / AST: 302 U/L / GGT: x             ____________________________________________    IMAGING:    CT A/P - Hyperattenuating material in the gallbladder lumen is compatible with sludge; Small hiatal hernia. Mild asymmetric wall thickening along the right side of the rectum

## 2020-02-18 NOTE — H&P ADULT - NSHPPHYSICALEXAM_GEN_ALL_CORE
T(C): 37 (02-18-20 @ 01:11), Max: 37 (02-18-20 @ 01:11)  HR: 68 (02-18-20 @ 01:11) (68 - 84)  BP: 125/82 (02-18-20 @ 01:11) (125/82 - 144/88)  RR: 20 (02-18-20 @ 01:11) (20 - 20)  SpO2: 96% (02-18-20 @ 01:11) (96% - 97%)  Wt(kg): --    PHYSICAL EXAM:  GENERAL: NAD, well-groomed, well-developed  NECK: Supple, No JVD, Normal thyroid  CHEST/LUNG: Clear to percussion bilaterally; No rales, rhonchi, wheezing, or rubs  HEART: Regular rate and rhythm; S1, S2  ABDOMEN: Soft, Nondistended, epigastric tenderness, No Joshi sign   EXTREMITIES:  2+ Peripheral Pulses, No calf tenderness

## 2020-02-18 NOTE — H&P ADULT - NSICDXFAMILYHX_GEN_ALL_CORE_FT
FAMILY HISTORY:  Father  Still living? No  Family history of lung cancer, Age at diagnosis: Age Unknown

## 2020-02-18 NOTE — H&P ADULT - HISTORY OF PRESENT ILLNESS
59 year old male with PMHx of gastritis and s/p colorectal polypectomy in 2017 presents to the ED with complaints of epigastric pain x1day.  Patient reports having multiple episodes of nonbloody and nonbilious emesis associated with some nausea. Patient states that his epigastric pain is consistent with his past gastritis symptoms but underwent a colonoscopy two days ago and had a polyp removed. Denies acidic foods, coffee, and has not taken any NSAIDs. Admit having onions and peppers and some greasy food yesterday. Denies fevers, chest pain, shortness of breath, dysuria, hematuria, diarrhea, constipation, or any other acute complaints. 59 year old male with PMHx of gastritis and s/p colorectal surgery for polyp in 2017 presents to the ED with complaints of epigastric pain x1day.  Patient reports having multiple episodes of nonbloody and nonbilious emesis associated with some nausea. Patient states that his epigastric pain is consistent with his past gastritis symptoms but underwent a colonoscopy two days ago and had a polyp removed. Denies acidic foods, coffee, and has not taken any NSAIDs. Admit having onions and peppers and some greasy food yesterday. Denies fevers, chest pain, shortness of breath, dysuria, hematuria, diarrhea, constipation, or any other acute complaints.

## 2020-02-18 NOTE — H&P ADULT - ASSESSMENT
59 year old male with PMHx of gastritis and s/p colorectal polypectomy in 2017 presents to the ED with complaints of epigastric pain x1day.  PE shows an epigastric tenderness, No RUQ tenderness but lab shows increase LFT and CT can shows sludge and gallstones. Pt will be admitted to r/o Acute Amy or Symptomatic  Cholelithiasis with an US  this AM .    Admit to Surgery under Dr Harsh BOWMAN  IVF on NPO   Pain/Nausea control   Serial Abd Exams   GB US this AM   DVT PPx 59 year old male with PMHx of gastritis and s/p colorectal surgery for polyp in 2017 presents to the ED with complaints of epigastric pain x1day.  PE shows an epigastric tenderness, No RUQ tenderness but lab shows increase LFT and CT can shows sludge and gallstones. Pt will be admitted to r/o Acute Amy or Symptomatic  Cholelithiasis with an US  this AM .    Admit to Surgery under Dr Harsh BOWMAN  IVF on NPO   Pain/Nausea control   Serial Abd Exams   GB US this AM   DVT PPx

## 2020-02-18 NOTE — CONSULT NOTE ADULT - PROBLEM SELECTOR RECOMMENDATION 9
Tbili elevated  f/u MRCP - NPO  trend LFT's Tbili and LFT's elevated likely 2/2 CBD stone although direct less than half of tbili  f/u MRCP - NPO  trend LFT's  f/u hepatitis panel  Plan for ERCP tomorrow pending MRCP results

## 2020-02-18 NOTE — H&P ADULT - ATTENDING COMMENTS
pt seen earlier  denies any pain now  elevated LFTs  Had a long d/w the pt, needs MRCP and possible ERCP  has a well healed long midline scar

## 2020-02-18 NOTE — H&P ADULT - NSHPLABSRESULTS_GEN_ALL_CORE
16.9   10.41 )-----------( 256      ( 17 Feb 2020 22:29 )             47.8   02-17    138  |  104  |  9   ----------------------------<  117<H>  4.0   |  31  |  1.09    Ca    9.1      17 Feb 2020 22:29    TPro  7.8  /  Alb  4.1  /  TBili  4.5<H>  /  DBili  x   /  AST  302<H>  /  ALT  420<H>  /  AlkPhos  142<H>  02-17    < from: CT Abdomen and Pelvis w/ IV Cont (02.18.20 @ 01:33) >    IMPRESSION:   Hyperattenuating material in the gallbladder lumen is compatible with sludge.  Right upper quadrant ultrasound may be obtained to exclude gallstones.    Small hiatal hernia.    Mild asymmetric wall thickening along the right side of the rectum is nonspecific; correlate with recent colonoscopy results.         < end of copied text >

## 2020-02-18 NOTE — PATIENT PROFILE ADULT - FALL HARM RISK TYPE OF ASSESSMENT
March 18, 2019                   [facility]  Pediatric Neurology  [address]  [facility] [address]  Phone: [phone]   March 18, 2019     Patient: Brooklynn Pryor   YOB: 2000   Date of Visit: 3/18/2019       To Whom it May Concern:    Brooklynn Pryor was seen in my clinic on 3/18/2019. She may return to school on 3/19/2019.    If you have any questions or concerns, please don't hesitate to call.    Sincerely,         Scarlet Gray MA      daily assessment

## 2020-02-19 LAB
ALBUMIN SERPL ELPH-MCNC: 4 G/DL — SIGNIFICANT CHANGE UP (ref 3.5–5)
ALP SERPL-CCNC: 159 U/L — HIGH (ref 40–120)
ALT FLD-CCNC: 477 U/L DA — HIGH (ref 10–60)
ANION GAP SERPL CALC-SCNC: 8 MMOL/L — SIGNIFICANT CHANGE UP (ref 5–17)
AST SERPL-CCNC: 137 U/L — HIGH (ref 10–40)
BILIRUB DIRECT SERPL-MCNC: 0.5 MG/DL — HIGH (ref 0–0.2)
BILIRUB SERPL-MCNC: 1.4 MG/DL — HIGH (ref 0.2–1.2)
BUN SERPL-MCNC: 9 MG/DL — SIGNIFICANT CHANGE UP (ref 7–18)
CALCIUM SERPL-MCNC: 9.4 MG/DL — SIGNIFICANT CHANGE UP (ref 8.4–10.5)
CHLORIDE SERPL-SCNC: 107 MMOL/L — SIGNIFICANT CHANGE UP (ref 96–108)
CO2 SERPL-SCNC: 25 MMOL/L — SIGNIFICANT CHANGE UP (ref 22–31)
CREAT SERPL-MCNC: 1.09 MG/DL — SIGNIFICANT CHANGE UP (ref 0.5–1.3)
GLUCOSE SERPL-MCNC: 103 MG/DL — HIGH (ref 70–99)
HAV IGG SER QL IA: REACTIVE
HAV IGM SER-ACNC: SIGNIFICANT CHANGE UP
HBV CORE IGM SER-ACNC: SIGNIFICANT CHANGE UP
HBV SURFACE AB SER-ACNC: SIGNIFICANT CHANGE UP
HBV SURFACE AG SER-ACNC: REACTIVE
HCT VFR BLD CALC: 48.4 % — SIGNIFICANT CHANGE UP (ref 39–50)
HCV AB S/CO SERPL IA: 0.56 S/CO — SIGNIFICANT CHANGE UP (ref 0–0.99)
HCV AB SERPL-IMP: SIGNIFICANT CHANGE UP
HGB BLD-MCNC: 16.8 G/DL — SIGNIFICANT CHANGE UP (ref 13–17)
MCHC RBC-ENTMCNC: 29.8 PG — SIGNIFICANT CHANGE UP (ref 27–34)
MCHC RBC-ENTMCNC: 34.7 GM/DL — SIGNIFICANT CHANGE UP (ref 32–36)
MCV RBC AUTO: 86 FL — SIGNIFICANT CHANGE UP (ref 80–100)
NRBC # BLD: 0 /100 WBCS — SIGNIFICANT CHANGE UP (ref 0–0)
PLATELET # BLD AUTO: 210 K/UL — SIGNIFICANT CHANGE UP (ref 150–400)
POTASSIUM SERPL-MCNC: 3.8 MMOL/L — SIGNIFICANT CHANGE UP (ref 3.5–5.3)
POTASSIUM SERPL-SCNC: 3.8 MMOL/L — SIGNIFICANT CHANGE UP (ref 3.5–5.3)
PROT SERPL-MCNC: 7.6 G/DL — SIGNIFICANT CHANGE UP (ref 6–8.3)
RBC # BLD: 5.63 M/UL — SIGNIFICANT CHANGE UP (ref 4.2–5.8)
RBC # FLD: 12.3 % — SIGNIFICANT CHANGE UP (ref 10.3–14.5)
SODIUM SERPL-SCNC: 140 MMOL/L — SIGNIFICANT CHANGE UP (ref 135–145)
WBC # BLD: 5.92 K/UL — SIGNIFICANT CHANGE UP (ref 3.8–10.5)
WBC # FLD AUTO: 5.92 K/UL — SIGNIFICANT CHANGE UP (ref 3.8–10.5)

## 2020-02-19 PROCEDURE — 99233 SBSQ HOSP IP/OBS HIGH 50: CPT

## 2020-02-19 PROCEDURE — 99232 SBSQ HOSP IP/OBS MODERATE 35: CPT

## 2020-02-19 PROCEDURE — 74181 MRI ABDOMEN W/O CONTRAST: CPT | Mod: 26

## 2020-02-19 RX ADMIN — SODIUM CHLORIDE 120 MILLILITER(S): 9 INJECTION, SOLUTION INTRAVENOUS at 21:11

## 2020-02-19 RX ADMIN — SODIUM CHLORIDE 120 MILLILITER(S): 9 INJECTION, SOLUTION INTRAVENOUS at 06:01

## 2020-02-19 RX ADMIN — Medication 100 GRAM(S): at 05:59

## 2020-02-19 RX ADMIN — HEPARIN SODIUM 5000 UNIT(S): 5000 INJECTION INTRAVENOUS; SUBCUTANEOUS at 05:59

## 2020-02-19 RX ADMIN — HEPARIN SODIUM 5000 UNIT(S): 5000 INJECTION INTRAVENOUS; SUBCUTANEOUS at 21:09

## 2020-02-19 RX ADMIN — Medication 100 GRAM(S): at 17:02

## 2020-02-19 NOTE — PROGRESS NOTE ADULT - PROBLEM SELECTOR PLAN 1
LFT's and tbili trending down  s/p MRCP this am showing normal caliber CBD and no evidence of choledocholithiasis, therefore ERCP canceled  f/u Hepatitis panel  patient w/o abdominal pain - recommend clear liquid diet if ok with sx  continue to monitor labs

## 2020-02-19 NOTE — PROGRESS NOTE ADULT - ASSESSMENT
GI following this 59 year old male with PMHx of gastritis and s/p colorectal surgery for polyp in 2017 who presented to the ED with complaints of epigastric pain associated with nausea  x1day.  LFT's elevated and CT A/P shows sludge.  Pt admitted to r/o Acute Amy or Symptomatic  Cholelithiasis.  Patient seen and examined lying in bed in NAD. Afebrile with stable vital signs. Reports having colonoscopy 2/12/20 showing gastritis. He is s/p MRCP this am showing no evidence of choledocholithiasis.

## 2020-02-19 NOTE — PROGRESS NOTE ADULT - SUBJECTIVE AND OBJECTIVE BOX
GI PROGRESS NOTE    Patient is a 59y old  Male who presents with a chief complaint of Epigastric pain (18 Feb 2020 12:56)      HPI:  59 year old male with PMHx of gastritis and s/p colorectal surgery for polyp in 2017 presents to the ED with complaints of epigastric pain x1day.  Patient reports having multiple episodes of nonbloody and nonbilious emesis associated with some nausea. Patient states that his epigastric pain is consistent with his past gastritis symptoms but underwent a colonoscopy two days ago and had a polyp removed. Denies acidic foods, coffee, and has not taken any NSAIDs. Admit having onions and peppers and some greasy food yesterday. Denies fevers, chest pain, shortness of breath, dysuria, hematuria, diarrhea, constipation, or any other acute complaints. (18 Feb 2020 04:40)          ______________________________________________________________________  PMH/PSH:  PAST MEDICAL & SURGICAL HISTORY:  Gastritis  Malignant neoplasm of rectum  Colon polyps  History of colon resection: 2017    ______________________________________________________________________  MEDS:  MEDICATIONS  (STANDING):  acetaminophen  IVPB .. 1000 milliGRAM(s) IV Intermittent once  cefoTEtan  IVPB      cefoTEtan  IVPB 1 Gram(s) IV Intermittent every 12 hours  dextrose 5% + sodium chloride 0.45%. 1000 milliLiter(s) (120 mL/Hr) IV Continuous <Continuous>  heparin  Injectable 5000 Unit(s) SubCutaneous every 8 hours  influenza   Vaccine 0.5 milliLiter(s) IntraMuscular once    MEDICATIONS  (PRN):  morphine  - Injectable 4 milliGRAM(s) IV Push every 6 hours PRN Moderate Pain (4 - 6)  ondansetron Injectable 4 milliGRAM(s) IV Push every 6 hours PRN Nausea and/or Vomiting    ______________________________________________________________________  ALL:   Allergies    No Known Allergies    Intolerances      ______________________________________________________________________  SH: Social History:    ______________________________________________________________________  FH:  FAMILY HISTORY:  Family history of lung cancer    ______________________________________________________________________  ROS:    CONSTITUTIONAL:  No weight loss, fever, chills, weakness or fatigue.    HEENT:  Eyes:  No visual loss, blurred vision, double vision or yellow sclerae. Ears, Nose, Throat:  No hearing loss, sneezing, congestion, runny nose or sore throat.    SKIN:  No rash or itching.    CARDIOVASCULAR:  No chest pain, chest pressure or chest discomfort. No palpitations or edema.    RESPIRATORY:  No shortness of breath, cough or sputum.    GASTROINTESTINAL:  SEE HPI    GENITOURINARY:  No dysuria, hematuria, urinary frequency    NEUROLOGICAL:  No headache, dizziness, syncope, paralysis, ataxia, numbness or tingling in the extremities. No change in bowel or bladder control.    MUSCULOSKELETAL:  No muscle, back pain, joint pain or stiffness.    HEMATOLOGIC:  No anemia, bleeding or bruising.    LYMPHATICS:  No enlarged nodes. No history of splenectomy.    PSYCHIATRIC:  No history of depression or anxiety.    ENDOCRINOLOGIC:  No reports of sweating, cold or heat intolerance. No polyuria or polydipsia.    ALLERGIES:  No history of asthma, hives, eczema or rhinitis.  ______________________________________________________________________  PHYSICAL EXAM:  T(C): 36.7 (02-19-20 @ 05:10), Max: 37.1 (02-18-20 @ 14:08)  HR: 64 (02-19-20 @ 05:10)  BP: 106/62 (02-19-20 @ 05:10)  RR: 18 (02-19-20 @ 05:10)  SpO2: 96% (02-19-20 @ 05:10)  Wt(kg): --      GEN: NAD   CVS- S1 S2  ABD: soft nontender, non distended, bowel sounds+, no rebound, no guarding  LUNGS: clear to auscultation  NEURO: non focal neuro exam; Alert and oriented to person, place, time, and situation   Extremities: no cyanosis, no calf tenderness, no edema, no clubbing      ______________________________________________________________________  LABS:                        16.8   5.92  )-----------( 210      ( 19 Feb 2020 10:12 )             48.4     02-19    140  |  107  |  9   ----------------------------<  103<H>  3.8   |  25  |  1.09    Ca    9.4      19 Feb 2020 10:12    TPro  7.6  /  Alb  4.0  /  TBili  1.4<H>  /  DBili  x   /  AST  137<H>  /  ALT  477<H>  /  AlkPhos  159<H>  02-19    LIVER FUNCTIONS - ( 19 Feb 2020 10:12 )  Alb: 4.0 g/dL / Pro: 7.6 g/dL / ALK PHOS: 159 U/L / ALT: 477 U/L DA / AST: 137 U/L / GGT: x           ______________________________________________________________________  IMAGING:    MRCP - Cholelithiasis. No evidence for choledocholithiasis. The common bile duct maintains normal caliber.

## 2020-02-20 ENCOUNTER — TRANSCRIPTION ENCOUNTER (OUTPATIENT)
Age: 60
End: 2020-02-20

## 2020-02-20 LAB
AFP-TM SERPL-MCNC: 2.4 NG/ML — SIGNIFICANT CHANGE UP
ALBUMIN SERPL ELPH-MCNC: 3.3 G/DL — LOW (ref 3.5–5)
ALP SERPL-CCNC: 127 U/L — HIGH (ref 40–120)
ALT FLD-CCNC: 309 U/L DA — HIGH (ref 10–60)
ANION GAP SERPL CALC-SCNC: 7 MMOL/L — SIGNIFICANT CHANGE UP (ref 5–17)
AST SERPL-CCNC: 59 U/L — HIGH (ref 10–40)
BILIRUB SERPL-MCNC: 0.9 MG/DL — SIGNIFICANT CHANGE UP (ref 0.2–1.2)
BUN SERPL-MCNC: 7 MG/DL — SIGNIFICANT CHANGE UP (ref 7–18)
CALCIUM SERPL-MCNC: 8.9 MG/DL — SIGNIFICANT CHANGE UP (ref 8.4–10.5)
CHLORIDE SERPL-SCNC: 106 MMOL/L — SIGNIFICANT CHANGE UP (ref 96–108)
CO2 SERPL-SCNC: 26 MMOL/L — SIGNIFICANT CHANGE UP (ref 22–31)
CREAT SERPL-MCNC: 1.15 MG/DL — SIGNIFICANT CHANGE UP (ref 0.5–1.3)
GLUCOSE SERPL-MCNC: 104 MG/DL — HIGH (ref 70–99)
POTASSIUM SERPL-MCNC: 3.5 MMOL/L — SIGNIFICANT CHANGE UP (ref 3.5–5.3)
POTASSIUM SERPL-SCNC: 3.5 MMOL/L — SIGNIFICANT CHANGE UP (ref 3.5–5.3)
PROT SERPL-MCNC: 6.6 G/DL — SIGNIFICANT CHANGE UP (ref 6–8.3)
SODIUM SERPL-SCNC: 139 MMOL/L — SIGNIFICANT CHANGE UP (ref 135–145)

## 2020-02-20 PROCEDURE — 99232 SBSQ HOSP IP/OBS MODERATE 35: CPT

## 2020-02-20 RX ORDER — CHLORHEXIDINE GLUCONATE 213 G/1000ML
1 SOLUTION TOPICAL ONCE
Refills: 0 | Status: COMPLETED | OUTPATIENT
Start: 2020-02-21 | End: 2020-02-21

## 2020-02-20 RX ADMIN — Medication 100 GRAM(S): at 17:25

## 2020-02-20 RX ADMIN — HEPARIN SODIUM 5000 UNIT(S): 5000 INJECTION INTRAVENOUS; SUBCUTANEOUS at 05:23

## 2020-02-20 RX ADMIN — HEPARIN SODIUM 5000 UNIT(S): 5000 INJECTION INTRAVENOUS; SUBCUTANEOUS at 21:48

## 2020-02-20 RX ADMIN — HEPARIN SODIUM 5000 UNIT(S): 5000 INJECTION INTRAVENOUS; SUBCUTANEOUS at 13:08

## 2020-02-20 RX ADMIN — SODIUM CHLORIDE 120 MILLILITER(S): 9 INJECTION, SOLUTION INTRAVENOUS at 17:26

## 2020-02-20 RX ADMIN — Medication 100 GRAM(S): at 05:26

## 2020-02-20 NOTE — PROGRESS NOTE ADULT - PROBLEM SELECTOR PLAN 1
LFT's continues to trend down  Tbili WNL  Hepatitis Panel showing chronic hep B infection will r/o coinfection or superimposed infection with Hepatitis D LFT's continues to trend down  Tbili WNL  Hepatitis Panel showing chronic hep B infection will r/o coinfection or superimposed infection with Hepatitis D f/u Hep D AB  f/u AFP tumor marker

## 2020-02-20 NOTE — PROGRESS NOTE ADULT - SUBJECTIVE AND OBJECTIVE BOX
GI PROGRESS NOTE    Patient is a 59y old  Male who presents with a chief complaint of Epigastric pain (19 Feb 2020 11:19)      HPI:  59 year old male with PMHx of gastritis and s/p colorectal surgery for polyp in 2017 presents to the ED with complaints of epigastric pain x1day.  Patient reports having multiple episodes of nonbloody and nonbilious emesis associated with some nausea. Patient states that his epigastric pain is consistent with his past gastritis symptoms but underwent a colonoscopy two days ago and had a polyp removed. Denies acidic foods, coffee, and has not taken any NSAIDs. Admit having onions and peppers and some greasy food yesterday. Denies fevers, chest pain, shortness of breath, dysuria, hematuria, diarrhea, constipation, or any other acute complaints. (18 Feb 2020 04:40)          ______________________________________________________________________  PMH/PSH:  PAST MEDICAL & SURGICAL HISTORY:  Gastritis  Malignant neoplasm of rectum  Colon polyps  History of colon resection: 2017    ______________________________________________________________________  MEDS:  MEDICATIONS  (STANDING):  acetaminophen  IVPB .. 1000 milliGRAM(s) IV Intermittent once  cefoTEtan  IVPB      cefoTEtan  IVPB 1 Gram(s) IV Intermittent every 12 hours  dextrose 5% + sodium chloride 0.45%. 1000 milliLiter(s) (120 mL/Hr) IV Continuous <Continuous>  heparin  Injectable 5000 Unit(s) SubCutaneous every 8 hours  influenza   Vaccine 0.5 milliLiter(s) IntraMuscular once    MEDICATIONS  (PRN):  morphine  - Injectable 4 milliGRAM(s) IV Push every 6 hours PRN Moderate Pain (4 - 6)  ondansetron Injectable 4 milliGRAM(s) IV Push every 6 hours PRN Nausea and/or Vomiting    ______________________________________________________________________  ALL:   Allergies    No Known Allergies    Intolerances      _______________________________________________________________________  FH:  FAMILY HISTORY:  Family history of lung cancer    ______________________________________________________________________  ROS:    CONSTITUTIONAL:  No weight loss, fever, chills, weakness or fatigue.    HEENT:  Eyes:  No visual loss, blurred vision, double vision or yellow sclerae. Ears, Nose, Throat:  No hearing loss, sneezing, congestion, runny nose or sore throat.    SKIN:  No rash or itching.    CARDIOVASCULAR:  No chest pain, chest pressure or chest discomfort. No palpitations or edema.    RESPIRATORY:  No shortness of breath, cough or sputum.    GASTROINTESTINAL:  SEE HPI    GENITOURINARY:  No dysuria, hematuria, urinary frequency    NEUROLOGICAL:  No headache, dizziness, syncope, paralysis, ataxia, numbness or tingling in the extremities. No change in bowel or bladder control.    MUSCULOSKELETAL:  No muscle, back pain, joint pain or stiffness.    HEMATOLOGIC:  No anemia, bleeding or bruising.    LYMPHATICS:  No enlarged nodes. No history of splenectomy.    PSYCHIATRIC:  No history of depression or anxiety.    ENDOCRINOLOGIC:  No reports of sweating, cold or heat intolerance. No polyuria or polydipsia.    ALLERGIES:  No history of asthma, hives, eczema or rhinitis.  ______________________________________________________________________  PHYSICAL EXAM:  T(C): 36.7 (02-20-20 @ 05:05), Max: 36.8 (02-19-20 @ 22:19)  HR: 68 (02-20-20 @ 05:05)  BP: 117/70 (02-20-20 @ 05:05)  RR: 18 (02-20-20 @ 05:05)  SpO2: 99% (02-20-20 @ 05:05)  Wt(kg): --    02-19 - 02-20  --------------------------------------------------------  IN:    dextrose 5% + sodium chloride 0.45%.: 1440 mL    IV PiggyBack: 50 mL  Total IN: 1490 mL    OUT:  Total OUT: 0 mL    Total NET: 1490 mL          GEN: NAD   CVS- S1 S2  ABD: soft nontender, non distended, bowel sounds+  LUNGS: clear to auscultation  NEURO: noin focal neuro exam; AAO x 3  Extremities: no cyanosis, no calf tenderness, no edema, no clubbing      ______________________________________________________________________  LABS:                        16.8   5.92  )-----------( 210      ( 19 Feb 2020 10:12 )             48.4     02-20    139  |  106  |  7   ----------------------------<  104<H>  3.5   |  26  |  1.15    Ca    8.9      20 Feb 2020 06:48    TPro  6.6  /  Alb  3.3<L>  /  TBili  0.9  /  DBili  x   /  AST  59<H>  /  ALT  309<H>  /  AlkPhos  127<H>  02-20    LIVER FUNCTIONS - ( 20 Feb 2020 06:48 )  Alb: 3.3 g/dL / Pro: 6.6 g/dL / ALK PHOS: 127 U/L / ALT: 309 U/L DA / AST: 59 U/L / GGT: x           _

## 2020-02-20 NOTE — PROGRESS NOTE ADULT - ASSESSMENT
60 y/o male with acute cholecystitis, MRCP negative, and incisional hernia    - OR 2/21/20 for lap valdo and incisional hernia repair with Dr. House  - NPO past midnight, except medications  - IVF while NPO  - Preop labs ordered  - Chlorhexidene wipes  - Consent to be obtained

## 2020-02-20 NOTE — PROGRESS NOTE ADULT - ASSESSMENT
58 y/o male admitted with acute cholecystitis    1. clear liquid diet   2. npo after midnight   3. OR tomorrow   4. continue antibiotics

## 2020-02-20 NOTE — PROGRESS NOTE ADULT - SUBJECTIVE AND OBJECTIVE BOX
Preop Dx: acute cholecystitis, incisional hernia  Surgeon: Dr. House  Procedure: laparoscopic cholecystectomy and incisional hernia repair     Vital Signs Last 24 Hrs  T(C): 36.9 (20 Feb 2020 13:32), Max: 36.9 (20 Feb 2020 13:32)  T(F): 98.4 (20 Feb 2020 13:32), Max: 98.4 (20 Feb 2020 13:32)  HR: 69 (20 Feb 2020 13:32) (68 - 69)  BP: 122/65 (20 Feb 2020 13:32) (117/70 - 127/76)  RR: 18 (20 Feb 2020 13:32) (18 - 18)  SpO2: 96% (20 Feb 2020 13:32) (96% - 99%)                        16.8   5.92  )-----------( 210      ( 19 Feb 2020 10:12 )             48.4     02-20    139  |  106  |  7   ----------------------------<  104<H>  3.5   |  26  |  1.15    Ca    8.9      20 Feb 2020 06:48    TPro  6.6  /  Alb  3.3<L>  /  TBili  0.9  /  DBili  x   /  AST  59<H>  /  ALT  309<H>  /  AlkPhos  127<H>  02-20    Type and Screen: A neg

## 2020-02-20 NOTE — PROGRESS NOTE ADULT - SUBJECTIVE AND OBJECTIVE BOX
60 y/o male admitted with acute cholecystitis. MRCP negative for CBD stone. Patient examined at bedside, no complaints.   No nausea, no vomiting  Tolerating clear liquid diet    T(F): 98.1 (02-20-20 @ 05:05), Max: 98.3 (02-19-20 @ 22:19)  HR: 68 (02-20-20 @ 05:05) (61 - 68)  BP: 117/70 (02-20-20 @ 05:05) (117/69 - 127/76)  RR: 18 (02-20-20 @ 05:05) (17 - 18)  SpO2: 99% (02-20-20 @ 05:05) (97% - 99%)  Wt(kg): --      02-19 @ 07:01  -  02-20 @ 07:00  --------------------------------------------------------  IN:    dextrose 5% + sodium chloride 0.45%.: 1440 mL    IV PiggyBack: 50 mL  Total IN: 1490 mL    OUT:  Total OUT: 0 mL    Total NET: 1490 mL                          16.8   5.92  )-----------( 210      ( 19 Feb 2020 10:12 )             48.4   02-20    139  |  106  |  7   ----------------------------<  104<H>  3.5   |  26  |  1.15    Ca    8.9      20 Feb 2020 06:48    TPro  6.6  /  Alb  3.3<L>  /  TBili  0.9  /  DBili  x   /  AST  59<H>  /  ALT  309<H>  /  AlkPhos  127<H>  02-20        Physical Exam  General: AAOx3, No acute distress  Skin: No jaundice, no icterus  Abdomen: soft, nontender, nondistended, no rebound tenderness, no guarding, no palpable masses  : Normal external genitalia  Extremities: non edematous, no calf pain bilaterally

## 2020-02-20 NOTE — PROGRESS NOTE ADULT - ASSESSMENT
GI following this 59 year old male with PMHx of gastritis and s/p colorectal surgery for polyp in 2017 who presented to the ED with complaints of epigastric pain associated with nausea  x1day.  Pt admitted to r/o Acute Amy or Symptomatic  Cholelithiasis.  He is s/p MRCP this am showing no evidence of choledocholithiasis. Patient seen and examined sitting OOB-chair in NAD. Denies abdominal pain, nausea or vomiting. Tolerating clear liquid diet. VSS afebrile

## 2020-02-21 ENCOUNTER — RESULT REVIEW (OUTPATIENT)
Age: 60
End: 2020-02-21

## 2020-02-21 LAB
ALBUMIN SERPL ELPH-MCNC: 4.1 G/DL — SIGNIFICANT CHANGE UP (ref 3.5–5)
ALP SERPL-CCNC: 129 U/L — HIGH (ref 40–120)
ALT FLD-CCNC: 294 U/L DA — HIGH (ref 10–60)
ANION GAP SERPL CALC-SCNC: 13 MMOL/L — SIGNIFICANT CHANGE UP (ref 5–17)
AST SERPL-CCNC: 76 U/L — HIGH (ref 10–40)
BILIRUB SERPL-MCNC: 1.1 MG/DL — SIGNIFICANT CHANGE UP (ref 0.2–1.2)
BUN SERPL-MCNC: 7 MG/DL — SIGNIFICANT CHANGE UP (ref 7–18)
CALCIUM SERPL-MCNC: 9.3 MG/DL — SIGNIFICANT CHANGE UP (ref 8.4–10.5)
CHLORIDE SERPL-SCNC: 105 MMOL/L — SIGNIFICANT CHANGE UP (ref 96–108)
CO2 SERPL-SCNC: 21 MMOL/L — LOW (ref 22–31)
CREAT SERPL-MCNC: 0.88 MG/DL — SIGNIFICANT CHANGE UP (ref 0.5–1.3)
GLUCOSE SERPL-MCNC: 101 MG/DL — HIGH (ref 70–99)
POTASSIUM SERPL-MCNC: 3.6 MMOL/L — SIGNIFICANT CHANGE UP (ref 3.5–5.3)
POTASSIUM SERPL-SCNC: 3.6 MMOL/L — SIGNIFICANT CHANGE UP (ref 3.5–5.3)
PROT SERPL-MCNC: 7.3 G/DL — SIGNIFICANT CHANGE UP (ref 6–8.3)
SODIUM SERPL-SCNC: 139 MMOL/L — SIGNIFICANT CHANGE UP (ref 135–145)

## 2020-02-21 PROCEDURE — 88304 TISSUE EXAM BY PATHOLOGIST: CPT | Mod: 26

## 2020-02-21 PROCEDURE — 49560: CPT | Mod: 59

## 2020-02-21 PROCEDURE — 47562 LAPAROSCOPIC CHOLECYSTECTOMY: CPT

## 2020-02-21 RX ORDER — FENTANYL CITRATE 50 UG/ML
25 INJECTION INTRAVENOUS
Refills: 0 | Status: DISCONTINUED | OUTPATIENT
Start: 2020-02-21 | End: 2020-02-21

## 2020-02-21 RX ORDER — OXYCODONE AND ACETAMINOPHEN 5; 325 MG/1; MG/1
1 TABLET ORAL EVERY 6 HOURS
Refills: 0 | Status: DISCONTINUED | OUTPATIENT
Start: 2020-02-21 | End: 2020-02-22

## 2020-02-21 RX ORDER — SODIUM CHLORIDE 9 MG/ML
1000 INJECTION, SOLUTION INTRAVENOUS
Refills: 0 | Status: DISCONTINUED | OUTPATIENT
Start: 2020-02-21 | End: 2020-02-21

## 2020-02-21 RX ORDER — SODIUM CHLORIDE 9 MG/ML
1000 INJECTION, SOLUTION INTRAVENOUS
Refills: 0 | Status: DISCONTINUED | OUTPATIENT
Start: 2020-02-21 | End: 2020-02-22

## 2020-02-21 RX ORDER — HEPARIN SODIUM 5000 [USP'U]/ML
5000 INJECTION INTRAVENOUS; SUBCUTANEOUS EVERY 8 HOURS
Refills: 0 | Status: DISCONTINUED | OUTPATIENT
Start: 2020-02-22 | End: 2020-02-22

## 2020-02-21 RX ORDER — ONDANSETRON 8 MG/1
4 TABLET, FILM COATED ORAL EVERY 6 HOURS
Refills: 0 | Status: DISCONTINUED | OUTPATIENT
Start: 2020-02-21 | End: 2020-02-22

## 2020-02-21 RX ADMIN — Medication 100 GRAM(S): at 06:07

## 2020-02-21 RX ADMIN — FENTANYL CITRATE 25 MICROGRAM(S): 50 INJECTION INTRAVENOUS at 16:36

## 2020-02-21 RX ADMIN — SODIUM CHLORIDE 120 MILLILITER(S): 9 INJECTION, SOLUTION INTRAVENOUS at 06:12

## 2020-02-21 RX ADMIN — FENTANYL CITRATE 25 MICROGRAM(S): 50 INJECTION INTRAVENOUS at 17:06

## 2020-02-21 RX ADMIN — HEPARIN SODIUM 5000 UNIT(S): 5000 INJECTION INTRAVENOUS; SUBCUTANEOUS at 06:08

## 2020-02-21 RX ADMIN — CHLORHEXIDINE GLUCONATE 1 APPLICATION(S): 213 SOLUTION TOPICAL at 08:19

## 2020-02-22 ENCOUNTER — TRANSCRIPTION ENCOUNTER (OUTPATIENT)
Age: 60
End: 2020-02-22

## 2020-02-22 VITALS
SYSTOLIC BLOOD PRESSURE: 132 MMHG | HEART RATE: 82 BPM | RESPIRATION RATE: 16 BRPM | DIASTOLIC BLOOD PRESSURE: 75 MMHG | TEMPERATURE: 100 F | OXYGEN SATURATION: 95 %

## 2020-02-22 LAB — HDV AB SER-ACNC: NEGATIVE — SIGNIFICANT CHANGE UP

## 2020-02-22 PROCEDURE — 86692 HEPATITIS DELTA AGENT ANTBDY: CPT

## 2020-02-22 PROCEDURE — 99285 EMERGENCY DEPT VISIT HI MDM: CPT | Mod: 25

## 2020-02-22 PROCEDURE — 86901 BLOOD TYPING SEROLOGIC RH(D): CPT

## 2020-02-22 PROCEDURE — 80074 ACUTE HEPATITIS PANEL: CPT

## 2020-02-22 PROCEDURE — 36415 COLL VENOUS BLD VENIPUNCTURE: CPT

## 2020-02-22 PROCEDURE — 86706 HEP B SURFACE ANTIBODY: CPT

## 2020-02-22 PROCEDURE — 88304 TISSUE EXAM BY PATHOLOGIST: CPT

## 2020-02-22 PROCEDURE — 74177 CT ABD & PELVIS W/CONTRAST: CPT

## 2020-02-22 PROCEDURE — 85027 COMPLETE CBC AUTOMATED: CPT

## 2020-02-22 PROCEDURE — 85610 PROTHROMBIN TIME: CPT

## 2020-02-22 PROCEDURE — 96374 THER/PROPH/DIAG INJ IV PUSH: CPT

## 2020-02-22 PROCEDURE — 83690 ASSAY OF LIPASE: CPT

## 2020-02-22 PROCEDURE — 86900 BLOOD TYPING SEROLOGIC ABO: CPT

## 2020-02-22 PROCEDURE — 86803 HEPATITIS C AB TEST: CPT

## 2020-02-22 PROCEDURE — 93005 ELECTROCARDIOGRAM TRACING: CPT

## 2020-02-22 PROCEDURE — 85730 THROMBOPLASTIN TIME PARTIAL: CPT

## 2020-02-22 PROCEDURE — 82105 ALPHA-FETOPROTEIN SERUM: CPT

## 2020-02-22 PROCEDURE — 80053 COMPREHEN METABOLIC PANEL: CPT

## 2020-02-22 PROCEDURE — 86708 HEPATITIS A ANTIBODY: CPT

## 2020-02-22 PROCEDURE — 86850 RBC ANTIBODY SCREEN: CPT

## 2020-02-22 PROCEDURE — 74181 MRI ABDOMEN W/O CONTRAST: CPT

## 2020-02-22 PROCEDURE — 87912 NFCT AGT GNTYP ALYS HEP B: CPT

## 2020-02-22 PROCEDURE — 82248 BILIRUBIN DIRECT: CPT

## 2020-02-22 RX ORDER — OXYCODONE AND ACETAMINOPHEN 5; 325 MG/1; MG/1
1 TABLET ORAL
Qty: 0 | Refills: 0 | DISCHARGE
Start: 2020-02-22

## 2020-02-22 RX ADMIN — HEPARIN SODIUM 5000 UNIT(S): 5000 INJECTION INTRAVENOUS; SUBCUTANEOUS at 05:07

## 2020-02-22 RX ADMIN — SODIUM CHLORIDE 100 MILLILITER(S): 9 INJECTION, SOLUTION INTRAVENOUS at 05:09

## 2020-02-22 NOTE — DISCHARGE NOTE PROVIDER - NSDCFUADDINST_GEN_ALL_CORE_FT
- can remove dressing in 48 hours  - do not take steri-strips off, allow them to fall off on their own  - do not lift anything heavier than 10 lbs for 2-4 weeks, avoid strenuous activity for 2 weeks  - take over the counter medications for pain, tylenol or ibuprofen  - shower as necessary, do not soak or bathe until cleared by surgeon  - follow up with surgeon in 2 weeks, call to schedule an appointment

## 2020-02-22 NOTE — PROGRESS NOTE ADULT - SUBJECTIVE AND OBJECTIVE BOX
60 yo male POD 1 s/p laparoscopic cholecystectomy and incisional hernia repair     no acute events o/n, doing well. pain well controlled, voiding and tolerated clear diet     Vital Signs Last 24 Hrs  T(C): 37.2 (22 Feb 2020 06:19), Max: 37.4 (21 Feb 2020 15:51)  T(F): 98.9 (22 Feb 2020 06:19), Max: 99.3 (21 Feb 2020 15:51)  HR: 66 (22 Feb 2020 06:19) (61 - 94)  BP: 124/68 (22 Feb 2020 06:19) (124/68 - 155/75)  BP(mean): 94 (21 Feb 2020 18:21) (94 - 104)  RR: 18 (22 Feb 2020 06:19) (17 - 20)  SpO2: 96% (22 Feb 2020 06:19) (95% - 99%)    Physical Exam:   Gen: NAD, cooperative   Chest; nl resp effort   Abdomen: Soft, non-distended, operative trocar site dressings in place, mildly saturated w/ SS fluid but appears dried out now, sites w/o erythema, tenderness as appropriate for post op

## 2020-02-22 NOTE — DISCHARGE NOTE PROVIDER - NSDCMRMEDTOKEN_GEN_ALL_CORE_FT
Carafate 1 g oral tablet: 1 tab(s) orally every 6 hours as needed for pain  esomeprazole 40 mg oral delayed release capsule: 1 cap(s) orally once a day take 30 min prior to any meal  oxycodone-acetaminophen 5 mg-325 mg oral tablet: 1 tab(s) orally every 6 hours, As needed, Moderate Pain (4 - 6)

## 2020-02-22 NOTE — DISCHARGE NOTE PROVIDER - HOSPITAL COURSE
59 year old male presented with epigastric pain, elevated LFT's w/ imaging findings noted for gallstones and sludge. patient was admitted to surgical service for workup of acute cholecystitis vs symptomatic cholelithiases vs choledocholithiases with initiation of IV antibiotics. GI evaluated patient, MRCP was performed with no evidence of ductal stones. pt was then preoped for the OR and a laparoscopic cholecystectomy and incisional hernia repair was performed. pt was advanced to clears immediately post op - which he tolerated and then advanced to regular diet POD1 - at time of discharge, pt was tolerating diet, pain was controlled, ambulated and voided. all questions were answered and patient demonstrated understanding of discharge instructions.

## 2020-02-22 NOTE — CHART NOTE - NSCHARTNOTEFT_GEN_A_CORE
Pt POD 0 s/p lap valdo, incisional hernia repair  resting comfortably  no n/v  laura clears    Vital Signs Last 24 Hrs  T(C): 37.2 (22 Feb 2020 00:25), Max: 37.4 (21 Feb 2020 15:51)  T(F): 99 (22 Feb 2020 00:25), Max: 99.3 (21 Feb 2020 15:51)  HR: 90 (22 Feb 2020 00:25) (56 - 94)  BP: 126/65 (22 Feb 2020 00:25) (105/62 - 155/75)  BP(mean): 94 (21 Feb 2020 18:21) (94 - 104)  RR: 18 (22 Feb 2020 00:25) (17 - 20)  SpO2: 96% (22 Feb 2020 00:25) (95% - 99%)    abd soft, inc CDI    stable post-op Pt POD 0 s/p lap valdo, incisional hernia repair  resting comfortably  no n/v  laura clears  voided    Vital Signs Last 24 Hrs  T(C): 37.2 (22 Feb 2020 00:25), Max: 37.4 (21 Feb 2020 15:51)  T(F): 99 (22 Feb 2020 00:25), Max: 99.3 (21 Feb 2020 15:51)  HR: 90 (22 Feb 2020 00:25) (56 - 94)  BP: 126/65 (22 Feb 2020 00:25) (105/62 - 155/75)  BP(mean): 94 (21 Feb 2020 18:21) (94 - 104)  RR: 18 (22 Feb 2020 00:25) (17 - 20)  SpO2: 96% (22 Feb 2020 00:25) (95% - 99%)    abd soft, inc CDI    stable post-op

## 2020-02-22 NOTE — PROGRESS NOTE ADULT - ASSESSMENT
58 yo male POD 1 s/p laparoscopic cholecystectomy and incisional hernia repair, afebrile, hemodynamically stable - pain controlled and tolerating clears     - advance to regular diet - assess toleration   - pain control   - out of bed, encourage ambulation, incentive spirometry   - d/c IVF   - SCDs and chem DVT ppx   - likely d/c home today

## 2020-02-22 NOTE — DISCHARGE NOTE PROVIDER - NSDCCPCAREPLAN_GEN_ALL_CORE_FT
PRINCIPAL DISCHARGE DIAGNOSIS  Diagnosis: Acute cholecystitis  Assessment and Plan of Treatment:       SECONDARY DISCHARGE DIAGNOSES  Diagnosis: Transaminitis  Assessment and Plan of Treatment:

## 2020-02-22 NOTE — DISCHARGE NOTE PROVIDER - NSDCFUSCHEDAPPT_GEN_ALL_CORE_FT
SHARIFA GARRETT ; 04/14/2020 ; NPP [facility] [address] [facility] SHARIFA GARRETT ; 04/14/2020 ; NPP [place] [address] [facility]

## 2020-02-22 NOTE — PROGRESS NOTE ADULT - ATTENDING COMMENTS
Patient admitted with Cholelithiasis and elevated lfts. Radiology studies reviewed. Tender RUQ abdomen. Laparoscopic cholecystectomy possible open and repair of incisional hernia was discussed. The potential for bleeding, CBD injury, bowel injury and other related complications were discussed. All the questions were answered.  Informed consent for laparoscopic cholecystectomy possible open and incisional hernia repair  surgery was obtained
seen in am  abdomen soft  no complaints  if tolerating reg food, d/c home

## 2020-02-22 NOTE — DISCHARGE NOTE PROVIDER - NSDCQMERRANDS_GEN_ALL_CORE
Verbalized understanding of instructions. Alert. Minimal pain. Requested half percocet due to wanting to avoid overmedication. No

## 2020-02-22 NOTE — DISCHARGE NOTE NURSING/CASE MANAGEMENT/SOCIAL WORK - PATIENT PORTAL LINK FT
You can access the FollowMyHealth Patient Portal offered by [facility] by registering at the following website: [email or web address]. By joining [employer]’s FollowMyHealth portal, you will also be able to view your health information using other applications (apps) compatible with our system.

## 2020-02-22 NOTE — DISCHARGE NOTE PROVIDER - CARE PROVIDER_API CALL
Robert House (MD)  Surgery  [address] [place] [id number]  Phone: [phone]  Fax: [phone]  Follow Up Time: 1 week

## 2020-02-25 PROBLEM — K29.70 GASTRITIS, UNSPECIFIED, WITHOUT BLEEDING: Chronic | Status: ACTIVE | Noted: 2020-02-17

## 2020-02-26 LAB
BCP MUTATIONS: DETECTED — SIGNIFICANT CHANGE UP
HBV GENTYP SERPL NAA+PROBE: SIGNIFICANT CHANGE UP
POLYMERASE MUTATIONS: SIGNIFICANT CHANGE UP
PRECORE MUTATIONS: DETECTED — SIGNIFICANT CHANGE UP
SURGICAL PATHOLOGY STUDY: SIGNIFICANT CHANGE UP

## 2020-02-27 ENCOUNTER — APPOINTMENT (OUTPATIENT)
Dept: SURGERY | Facility: CLINIC | Age: 60
End: 2020-02-27
Payer: COMMERCIAL

## 2020-02-27 VITALS
WEIGHT: 200 LBS | BODY MASS INDEX: 29.62 KG/M2 | SYSTOLIC BLOOD PRESSURE: 115 MMHG | OXYGEN SATURATION: 99 % | TEMPERATURE: 98.7 F | DIASTOLIC BLOOD PRESSURE: 75 MMHG | HEART RATE: 60 BPM | HEIGHT: 69 IN

## 2020-02-27 PROCEDURE — 99024 POSTOP FOLLOW-UP VISIT: CPT

## 2020-02-27 NOTE — HISTORY OF PRESENT ILLNESS
[de-identified] : SHARIFA GARRETT presents to the office for postoperative visit today, he is s/p laparoscopic cholecystectomy, lysis of adhesion, and repair of incisional hernia, 02/21/20. Path results c/w Chronic calculus cholecystitis and cholesterolosis. \par \par Patient is without reported complaints. Denies abdominal pain. No nausea/vomiting. No fevers/chills. He is tolerating a regular diet with normal appetite. Normal BM's.\par

## 2020-02-27 NOTE — PHYSICAL EXAM
[Normal Rate and Rhythm] : normal rate and rhythm [Normal Breath Sounds] : Normal breath sounds [No Rash or Lesion] : No rash or lesion [Alert] : alert [Oriented to Person] : oriented to person [Oriented to Time] : oriented to time [Oriented to Place] : oriented to place [Calm] : calm [JVD] : no jugular venous distention  [de-identified] : EOMI, Anicteric sclera [de-identified] : A/Ox3; NAD. appears comfortable [de-identified] : Abdomen soft and non tender. Wounds healing well. Port sites with no erythema or drainage.

## 2020-02-27 NOTE — REVIEW OF SYSTEMS
[Chills] : no chills [Fever] : no fever [Feeling Poorly] : not feeling poorly [Lower Ext Edema] : no extremity edema [Chest Pain] : no chest pain [Cough] : no cough [Shortness Of Breath] : no shortness of breath [Abdominal Pain] : no abdominal pain [Vomiting] : no vomiting [Constipation] : no constipation [Diarrhea] : no diarrhea [Hesitancy] : no urinary hesitancy [Arthralgias] : no arthralgias [Dizziness] : no dizziness [Confused] : no confusion [Anxiety] : no anxiety [Swollen Glands] : no swollen glands [FreeTextEntry7] : s

## 2020-02-27 NOTE — REASON FOR VISIT
[Post Op: _________] : a [unfilled] post op visit [FreeTextEntry1] : s/p laparoscopic cholecystectomy, lysis of adhesion, and repair of incisional hernia, 02/21/20.

## 2020-02-27 NOTE — ASSESSMENT
[FreeTextEntry1] : SHARIFA GARRETT presents to the office for postoperative visit today, he is s/p laparoscopic cholecystectomy, lysis of adhesion, and repair of incisional hernia, 02/21/20. Path results c/w Chronic calculus cholecystitis and cholesterolosis.  Patient without reported complaints. No fevers/chills. No pain. \par \par Patient is doing well, with excellent post-operative recovery. All surgical incisions are healing well and as expected. There is no evidence of infection or complication, and patient is progressing as expected.\par

## 2020-04-14 ENCOUNTER — APPOINTMENT (OUTPATIENT)
Dept: SURGICAL ONCOLOGY | Facility: CLINIC | Age: 60
End: 2020-04-14

## 2020-05-06 ENCOUNTER — OUTPATIENT (OUTPATIENT)
Dept: OUTPATIENT SERVICES | Facility: HOSPITAL | Age: 60
LOS: 1 days | End: 2020-05-06
Payer: COMMERCIAL

## 2020-05-06 ENCOUNTER — APPOINTMENT (OUTPATIENT)
Dept: CT IMAGING | Facility: IMAGING CENTER | Age: 60
End: 2020-05-06

## 2020-05-06 ENCOUNTER — RESULT REVIEW (OUTPATIENT)
Age: 60
End: 2020-05-06

## 2020-05-06 DIAGNOSIS — Z90.49 ACQUIRED ABSENCE OF OTHER SPECIFIED PARTS OF DIGESTIVE TRACT: Chronic | ICD-10-CM

## 2020-05-06 DIAGNOSIS — K63.5 POLYP OF COLON: Chronic | ICD-10-CM

## 2020-05-06 DIAGNOSIS — C19 MALIGNANT NEOPLASM OF RECTOSIGMOID JUNCTION: ICD-10-CM

## 2020-05-06 PROCEDURE — 74177 CT ABD & PELVIS W/CONTRAST: CPT | Mod: 26

## 2020-05-06 PROCEDURE — 74177 CT ABD & PELVIS W/CONTRAST: CPT

## 2020-05-14 NOTE — ASU PREOP CHECKLIST - STERILIZATION AFFIRMATION
Patient does not need refills on these medications as she has already completed a 10-day antibiotic course. Thanks! n/a

## 2022-01-02 NOTE — ASU PREOP CHECKLIST - BP NONINVASIVE DIASTOLIC (MM HG)
81 year-old woman with DM, CAD s/p PCI, CABG 2018, chronic systolic CHF, afib on Xarelto, s/p PPM, asthma, chronic respiratory failure on home O2 2L/min, last asthma exacerbation ~ 3 weeks prior to this presentation, was treated with course of doxycycline and prednisone, had prednisone tapered and once she was down to 10mg prednisone, she re-developed dyspnea, wheeze and cough so she came for evaluation. CXR negative. COVID, flu and RSV PCR negative. Started on IV steroids, continued on IC/LABA, standing NABILA, admitted to Medicine.     #Acute exacerbation of asthma  As suggested by symptoms triggered by tapering of her prednisone, as per patient. Denies tobacco use hx. Denies known hx of emphysema/COPD. States her last PFTs were approx 1 year ago in [place]. She has chronic respiratory failure for which she uses home O2 but she attributes that moreso to her cardiac issues (CHF) than her asthma. CXR neg. COVID, flu and RSV PCR negative. Appreciate input from Pulmonary Medicine. On IV steroids, IC, LABA, NABILA, and now improving gradually though peak flow still < 300.   - Continue Solumedrol, now reduced to 60iv BID per pulm on 1/1, will not further reduce today  - Continue IC/LABA/NABILA  - Can DC antibiotics as per Pulmonary Medicine  - Continue to check peak flow and trend for improvement    #Chronic systolic CHF  -trop neg,   -Per patient, last echo 3 months ago in [place], approx LVEF 45-50%. Currently appears euvolemic. On PO torsemide and spironolactone. Toprol XL. Not on ACE or ARB.   - s/p dose of iv lasix on 12/31  - Resume PO torsemide and spironolactone 1/1  - Continue Toprol XL  - Unclear why patient is not on ACE or ARB or Entresto, will discuss with patient, likely defer to her outside cardiologist  - Continue Is and Os, daily wt (requested RN re-weigh patient today and compare to 12/29)    #Afib  Chronic, stable, rate WNL  - Continue Toprol  - Continue Xarelto    #Type II DM  A1c 9.0. Hyperglycemic in setting of steroids and underlying suboptimal control as evidenced by her elevated Hgb A1c. On Tresiba, Trulicity and Jardiance as outpatient. Here she has been placed on Lantus and Lispro. Remains hyperglycemic.   - Continue Lantus and Lispro, up titrating both prn  - Continue to check glucose TID AC and HS    #Mild hyponatremia, improving  In setting of underlying CHF w/ reduced EF. Continue to manage as described above.  - Trend Na     #DVT px: On Xarelto for afib  #Code status: Full  #Dispo: Anticipate DC to home when clinically improved, possibly 24-48hrs 86

## 2023-01-17 ENCOUNTER — APPOINTMENT (OUTPATIENT)
Dept: SURGICAL ONCOLOGY | Facility: CLINIC | Age: 63
End: 2023-01-17
Payer: COMMERCIAL

## 2023-01-17 VITALS
HEIGHT: 69 IN | BODY MASS INDEX: 31.1 KG/M2 | SYSTOLIC BLOOD PRESSURE: 150 MMHG | WEIGHT: 210 LBS | DIASTOLIC BLOOD PRESSURE: 88 MMHG | HEART RATE: 69 BPM | TEMPERATURE: 97.1 F

## 2023-01-17 PROCEDURE — 99024 POSTOP FOLLOW-UP VISIT: CPT

## 2023-01-17 NOTE — PHYSICAL EXAM
[Normal] : supple, no neck mass and thyroid not enlarged [Normal Neck Lymph Nodes] : normal neck lymph nodes  [Normal Supraclavicular Lymph Nodes] : normal supraclavicular lymph nodes [Normal Groin Lymph Nodes] : normal groin lymph nodes [Normal Axillary Lymph Nodes] : normal axillary lymph nodes [Normal] : oriented to person, place and time, with appropriate affect [de-identified] : easily reducible mera-umbilical hernia in the midline incision

## 2023-02-02 ENCOUNTER — OUTPATIENT (OUTPATIENT)
Dept: OUTPATIENT SERVICES | Facility: HOSPITAL | Age: 63
LOS: 1 days | End: 2023-02-02
Payer: COMMERCIAL

## 2023-02-02 VITALS
TEMPERATURE: 98 F | RESPIRATION RATE: 16 BRPM | HEIGHT: 69 IN | OXYGEN SATURATION: 97 % | WEIGHT: 214.07 LBS | HEART RATE: 62 BPM | DIASTOLIC BLOOD PRESSURE: 78 MMHG | SYSTOLIC BLOOD PRESSURE: 142 MMHG

## 2023-02-02 DIAGNOSIS — Z90.49 ACQUIRED ABSENCE OF OTHER SPECIFIED PARTS OF DIGESTIVE TRACT: Chronic | ICD-10-CM

## 2023-02-02 DIAGNOSIS — Z01.818 ENCOUNTER FOR OTHER PREPROCEDURAL EXAMINATION: ICD-10-CM

## 2023-02-02 DIAGNOSIS — K43.2 INCISIONAL HERNIA WITHOUT OBSTRUCTION OR GANGRENE: ICD-10-CM

## 2023-02-02 DIAGNOSIS — K63.5 POLYP OF COLON: Chronic | ICD-10-CM

## 2023-02-02 DIAGNOSIS — Z98.890 OTHER SPECIFIED POSTPROCEDURAL STATES: Chronic | ICD-10-CM

## 2023-02-02 PROCEDURE — G0463: CPT

## 2023-02-02 NOTE — H&P PST ADULT - HISTORY OF PRESENT ILLNESS
62year old obese male with pmhx of rectal cancer, cholecystitis and incisional hernia presents with c/o recurrent incisional hernia x 1year that produces no pain. Patient is here today for presurgical testing for scheduled incisional hernia repair with mesh on 2/9/2023

## 2023-02-02 NOTE — H&P PST ADULT - NSICDXPASTSURGICALHX_GEN_ALL_CORE_FT
PAST SURGICAL HISTORY:  Colon polyps     History of cholecystectomy     History of colon resection 2017    History of incisional hernia repair

## 2023-02-02 NOTE — H&P PST ADULT - GASTROINTESTINAL COMMENTS
Hx Colon resection 2017, cholecystectomy, incisional hernia repair, Obese Incisional hernia without obstruction or gangrene

## 2023-02-02 NOTE — H&P PST ADULT - NSICDXPASTMEDICALHX_GEN_ALL_CORE_FT
PAST MEDICAL HISTORY:  Gastritis     History of cholecystitis     Incisional hernia     Malignant neoplasm of rectum     Obese

## 2023-02-02 NOTE — H&P PST ADULT - PROBLEM SELECTOR PLAN 1
Patient is scheduled for incisional hernia repair with mesh on 2/9/2023  Written and oral preoperative instructions given to patient with understanding verbalized.   Instructions given to include using 4% chlorhexidine wash as directed starting 3days before day of surgery (inclusive of day of surgery)  Maintaining NPO status post midnight day before surgery  Stopping aspirin, NSAIDs, herbs, vitamins 7days before surgery   Patient is to expect a phone call day before surgery between the hours of 430- 630pm giving arrival time for surgery day.    Covid swab test scheduled for 2/6/2023, Reaffirmed mandatory compliance   Patient today with STOP bang score of 2, Low risk for JARRETT   Medical preoperative optimization completed by Dr. Dc, PCP. Report to be obtained

## 2023-02-08 ENCOUNTER — TRANSCRIPTION ENCOUNTER (OUTPATIENT)
Age: 63
End: 2023-02-08

## 2023-02-08 RX ORDER — SODIUM CHLORIDE 9 MG/ML
3 INJECTION INTRAMUSCULAR; INTRAVENOUS; SUBCUTANEOUS EVERY 8 HOURS
Refills: 0 | Status: DISCONTINUED | OUTPATIENT
Start: 2023-02-09 | End: 2023-02-23

## 2023-02-09 ENCOUNTER — APPOINTMENT (OUTPATIENT)
Dept: SURGICAL ONCOLOGY | Facility: HOSPITAL | Age: 63
End: 2023-02-09

## 2023-02-09 ENCOUNTER — TRANSCRIPTION ENCOUNTER (OUTPATIENT)
Age: 63
End: 2023-02-09

## 2023-02-09 ENCOUNTER — OUTPATIENT (OUTPATIENT)
Dept: OUTPATIENT SERVICES | Facility: HOSPITAL | Age: 63
LOS: 1 days | End: 2023-02-09
Payer: COMMERCIAL

## 2023-02-09 VITALS
SYSTOLIC BLOOD PRESSURE: 128 MMHG | OXYGEN SATURATION: 61 % | HEART RATE: 61 BPM | TEMPERATURE: 98 F | HEIGHT: 69 IN | DIASTOLIC BLOOD PRESSURE: 79 MMHG | RESPIRATION RATE: 16 BRPM | WEIGHT: 214.07 LBS

## 2023-02-09 VITALS
SYSTOLIC BLOOD PRESSURE: 110 MMHG | RESPIRATION RATE: 18 BRPM | DIASTOLIC BLOOD PRESSURE: 63 MMHG | TEMPERATURE: 99 F | OXYGEN SATURATION: 94 % | HEART RATE: 82 BPM

## 2023-02-09 DIAGNOSIS — K43.2 INCISIONAL HERNIA WITHOUT OBSTRUCTION OR GANGRENE: ICD-10-CM

## 2023-02-09 DIAGNOSIS — Z90.49 ACQUIRED ABSENCE OF OTHER SPECIFIED PARTS OF DIGESTIVE TRACT: Chronic | ICD-10-CM

## 2023-02-09 DIAGNOSIS — Z98.890 OTHER SPECIFIED POSTPROCEDURAL STATES: Chronic | ICD-10-CM

## 2023-02-09 DIAGNOSIS — K63.5 POLYP OF COLON: Chronic | ICD-10-CM

## 2023-02-09 PROBLEM — E66.9 OBESITY, UNSPECIFIED: Chronic | Status: ACTIVE | Noted: 2023-02-02

## 2023-02-09 PROBLEM — Z87.19 PERSONAL HISTORY OF OTHER DISEASES OF THE DIGESTIVE SYSTEM: Chronic | Status: ACTIVE | Noted: 2023-02-02

## 2023-02-09 PROCEDURE — 44050 REDUCE BOWEL OBSTRUCTION: CPT

## 2023-02-09 PROCEDURE — C1781: CPT

## 2023-02-09 PROCEDURE — 49593 RPR AA HRN 1ST 3-10 RDC: CPT

## 2023-02-09 PROCEDURE — 49615 RPR AA HRN RCR 3-10 RDC: CPT | Mod: 59

## 2023-02-09 DEVICE — MESH HERNIA VENTRAL VENTRALIGHT ST ELLIPSE 4 X 6"
Type: IMPLANTABLE DEVICE | Status: NON-FUNCTIONAL
Removed: 2023-02-09

## 2023-02-09 RX ORDER — OXYCODONE AND ACETAMINOPHEN 5; 325 MG/1; MG/1
1 TABLET ORAL
Qty: 10 | Refills: 0
Start: 2023-02-09

## 2023-02-09 NOTE — ASU DISCHARGE PLAN (ADULT/PEDIATRIC) - ASU DC SPECIAL INSTRUCTIONSFT
Please follow-up with your surgeon in 1 week. Drink plenty of fluids and rest as needed. Call for any fever over 101, nausea, vomiting, severe pain, no passing of gas or bowel movement.     DIET   clear liquid diet 24 hours postop then may progress slowly to regular diet.  SURGICAL SITES   Remove outer dressing and keep white steri-strips in place allowing them to fall off on their own. You may shower 48 hours post-operatively but do not bathe or soak in the water for 1-2 weeks; pat dry. If you notice any signs of surgical site infection (ie. redness, swelling, pain, pus drainage), please seek medical care immediately.    ACTIVITY Do not lift anything heavier than 10 pounds for 2-3 months for hernia, no exercise for 2 weeks and avoid strenuous activity for 4-6 weeks.     PAIN CONTROL   You may take Motrin 600mg (with food) or Tylenol 650mg as needed for mild pain. Stagger one medication 3 hours after the other for maximum pain control. Maximum daily dose of Tylenol should not exceed 4grams/day.  You may take your prescribed percocet for severe breakthrough pain not that is not relieved by Tylenol/Motrin. Do not drive or make important decisions while taking this medication and do not take more than 4 pills in 24 hours.Please refer to medical records/ progress notes for in depth hospital course. Discharge plan discussed and agreed with attending.

## 2023-02-09 NOTE — BRIEF OPERATIVE NOTE - OPERATION/FINDINGS
Procedure: open incisional hernia repair with mesh   for complete report please follow up with full operative report

## 2023-02-09 NOTE — ASU DISCHARGE PLAN (ADULT/PEDIATRIC) - NS MD DC FALL RISK RISK
For information on Fall & Injury Prevention, visit: [email or web address] OR  [email or web address] OR  https://www.cdc.gov/steadi/patient.html

## 2023-02-09 NOTE — ASU DISCHARGE PLAN (ADULT/PEDIATRIC) - CARE PROVIDER_API CALL
Trevon Dudley)  Surgery  [address] [place] [id number]  Phone: [phone]  Fax: [phone]  Scheduled Appointment: 02/21/2023

## 2023-02-09 NOTE — ASU PATIENT PROFILE, ADULT - FALL HARM RISK - UNIVERSAL INTERVENTIONS
Bed in lowest position, wheels locked, appropriate side rails in place/Call bell, personal items and telephone in reach/Instruct patient to call for assistance before getting out of bed or chair/Non-slip footwear when patient is out of bed/[place] to call system/Physically safe environment - no spills, clutter or unnecessary equipment/Purposeful Proactive Rounding/Room/bathroom lighting operational, light cord in reach

## 2023-02-09 NOTE — BRIEF OPERATIVE NOTE - NSICDXBRIEFPROCEDURE_GEN_ALL_CORE_FT
PROCEDURES:  Open repair of recurrent incisional hernia of anterior abdominal wall using synthetic mesh and anterior component separation technique 09-Feb-2023 10:07:05  Omaira Tan

## 2023-02-21 ENCOUNTER — APPOINTMENT (OUTPATIENT)
Dept: SURGICAL ONCOLOGY | Facility: CLINIC | Age: 63
End: 2023-02-21
Payer: COMMERCIAL

## 2023-02-21 VITALS
TEMPERATURE: 96.2 F | SYSTOLIC BLOOD PRESSURE: 130 MMHG | HEART RATE: 70 BPM | DIASTOLIC BLOOD PRESSURE: 78 MMHG | OXYGEN SATURATION: 97 % | BODY MASS INDEX: 31.1 KG/M2 | HEIGHT: 69 IN | WEIGHT: 210 LBS

## 2023-02-21 DIAGNOSIS — K43.2 INCISIONAL HERNIA W/OUT OBSTRUCTION OR GANGRENE: ICD-10-CM

## 2023-02-21 DIAGNOSIS — C19 MALIGNANT NEOPLASM OF RECTOSIGMOID JUNCTION: ICD-10-CM

## 2023-02-21 PROCEDURE — 99024 POSTOP FOLLOW-UP VISIT: CPT

## 2023-02-21 NOTE — HISTORY OF PRESENT ILLNESS
[de-identified] : Mr. SHARIFA GARRETT is a 62 year old man here today for a post-op visit\par \par Initially seen in October of 2017, referred by Dr. Jack Che after a screening colonoscopy showed a large circumferential rectal mass, 80% obstructing, bx positive for invasive adeno, MMR-P\par \par HX of rectosigmoid cancer s/p LAR 10/2017, path ; \par moderately differentiated invasive adenocarcinoma, 6 cm, margins negative, no LVI or PNI seen, 0/14 LN involved, pT3 N0, MMR-P\par Oncotype DX 3\par \par lap HARLEEN lyle w/ incisional hernia repair 2/2020 with Dr. Harsh Jones, benign path \par \par 1/17/2023- patient with an incisional hernia which is mildly uncomfortable; has not been seen since 2019 \par s/p open incisional hernia repair with mesh on 2/9/2023

## 2023-02-21 NOTE — ASSESSMENT
[FreeTextEntry1] : IMP: \par 61yo M w/ HX of 6 cm invasive adeno of colon s/p LAR 10/2017\par \par p/w incisional hernia 1/2023\par open incisional hernia repair with mesh on 2/9/2023 \par \par PLAN:\par RTO 3 months

## 2023-11-30 ENCOUNTER — APPOINTMENT (OUTPATIENT)
Dept: ORTHOPEDIC SURGERY | Facility: CLINIC | Age: 63
End: 2023-11-30
Payer: COMMERCIAL

## 2023-11-30 DIAGNOSIS — S69.92XA UNSPECIFIED INJURY OF LEFT WRIST, HAND AND FINGER(S), INITIAL ENCOUNTER: ICD-10-CM

## 2023-11-30 PROCEDURE — 99202 OFFICE O/P NEW SF 15 MIN: CPT

## 2024-02-27 NOTE — PATIENT PROFILE ADULT. - ALCOHOL USE HISTORY SINGLE SELECT
Quality 110: Preventive Care And Screening: Influenza Immunization: Influenza Immunization not Administered for Documented Reasons.
Quality 402: Tobacco Use And Help With Quitting Among Adolescents: Patient screened for tobacco and never smoked
Quality 130: Documentation Of Current Medications In The Medical Record: Current Medications Documented
Quality 111:Pneumonia Vaccination Status For Older Adults: Patient did not receive any pneumococcal conjugate or polysaccharide vaccine on or after their 60th birthday and before the end of the measurement period
Quality 128: Preventive Care And Screening: Body Mass Index (Bmi) Screening And Follow-Up Plan: BMI is documented within normal parameters and no follow-up plan is required.
Detail Level: Detailed
Quality 431: Preventive Care And Screening: Unhealthy Alcohol Use - Screening: Patient not identified as an unhealthy alcohol user when screened for unhealthy alcohol use using a systematic screening method
yes...

## 2024-06-05 NOTE — H&P PST ADULT - ADMIT DATE
06-Oct-2017 Per [facility] IR, they called patient and she refused to go to [facility] due to transportation issues. New orders placed, signed by LN and faxed to [facility] with office note, imaging report, and demographics. Confirmation of fax received.    Postponing to follow up on scheduling

## 2025-05-06 NOTE — PATIENT PROFILE ADULT. - PT NEEDS ASSIST
Hub staff attempted to follow warm transfer process and was unsuccessful     Caller: NILTON GENAO    Relationship to patient: DAUGHTER IN LAW    Best call back number: [phone]    Patient is needing: PATIENTS PHYSICAL THERAPIST HAD SOME CONCERNS THEY WANTED ADDRESSED BY DR. MAST TO OBTAIN ADVICE ON HOW TO PROCEED. PATIENTS PHYSICAL THERAPIST HAS NOTICED A LOT OF MUSCLE REGRESSION IN THE PATIENT AS WELL AS LEFT LEG DRAGGING THE GROUND ALONG WITH HER LEFT FOOT TOES ARE GOING OUTWARD. PATIENTS DAUGHTER IN LAW DESCRIBES IT AS CHANGES BY THE HOUR GOING IN EITHER DIRECTION GOOD AND BAD. PATIENT DAUGHTER IN LAW SAID THE PATIENTS NECK AND SHOULDERS ARE STILL HURTING HER AS WELL. PATIENTS PHYSICAL THERAPIST THINKS SHE MAY NEED TO BE SEEN PRIOR TO HER APPOINTMENT ON 5.8.25 OR HAVE THE APPOINTMENT TYPE CHANGED TO ALLOW THE PATIENT THE OPPORTUNITY TO BE REASSESSED.       no

## (undated) DEVICE — PREP CHLORAPREP HI-LITE ORANGE 26ML

## (undated) DEVICE — DRSG STERISTRIPS 0.5 X 4"

## (undated) DEVICE — GLV 7.5 PROTEXIS (WHITE)

## (undated) DEVICE — GLV 7 PROTEXIS (WHITE)

## (undated) DEVICE — SUT POLYSORB 3-0 36" GS-21

## (undated) DEVICE — SUT SURGIPRO II 1 30" GS-25

## (undated) DEVICE — DRAPE 1/2 SHEET 40X57"

## (undated) DEVICE — SOL IRR POUR NS 0.9% 1500ML

## (undated) DEVICE — DRSG MASTISOL

## (undated) DEVICE — VENODYNE/SCD SLEEVE CALF MEDIUM

## (undated) DEVICE — SUT SURGIPRO 1 30" GS-21

## (undated) DEVICE — FOR-ESU VALLEYLAB T7E14982DX: Type: DURABLE MEDICAL EQUIPMENT

## (undated) DEVICE — SUT POLYSORB 4-0 27" P-12 UNDYED

## (undated) DEVICE — DRAPE LAPAROTOMY W POUCHES

## (undated) DEVICE — GLV 6 PROTEXIS (WHITE)

## (undated) DEVICE — ELCTR GROUNDING PAD ADULT COVIDIEN

## (undated) DEVICE — PACK MINOR NO DRAPE

## (undated) DEVICE — DRAPE LIGHT HANDLE COVER (BLUE)

## (undated) DEVICE — SUT POLYSORB 0 36" GS-25

## (undated) DEVICE — NDL HYPO SAFE 25G X 1.5" (ORANGE)

## (undated) DEVICE — WARMING BLANKET UPPER ADULT

## (undated) DEVICE — GLV 6.5 PROTEXIS (WHITE)